# Patient Record
Sex: FEMALE | NOT HISPANIC OR LATINO
[De-identification: names, ages, dates, MRNs, and addresses within clinical notes are randomized per-mention and may not be internally consistent; named-entity substitution may affect disease eponyms.]

---

## 2017-08-11 ENCOUNTER — APPOINTMENT (OUTPATIENT)
Dept: HOME HEALTH SERVICES | Facility: HOME HEALTH | Age: 82
End: 2017-08-11
Payer: MEDICARE

## 2017-08-11 VITALS
HEART RATE: 76 BPM | TEMPERATURE: 98.3 F | HEIGHT: 66 IN | OXYGEN SATURATION: 97 % | RESPIRATION RATE: 12 BRPM | BODY MASS INDEX: 22.18 KG/M2 | WEIGHT: 138 LBS | SYSTOLIC BLOOD PRESSURE: 110 MMHG | DIASTOLIC BLOOD PRESSURE: 60 MMHG

## 2017-08-11 DIAGNOSIS — M79.2 NEURALGIA AND NEURITIS, UNSPECIFIED: ICD-10-CM

## 2017-08-11 DIAGNOSIS — Z78.9 OTHER SPECIFIED HEALTH STATUS: ICD-10-CM

## 2017-08-11 DIAGNOSIS — H91.90 UNSPECIFIED HEARING LOSS, UNSPECIFIED EAR: ICD-10-CM

## 2017-08-11 DIAGNOSIS — Z86.69 PERSONAL HISTORY OF OTHER DISEASES OF THE NERVOUS SYSTEM AND SENSE ORGANS: ICD-10-CM

## 2017-08-11 DIAGNOSIS — Z86.73 PERSONAL HISTORY OF TRANSIENT ISCHEMIC ATTACK (TIA), AND CEREBRAL INFARCTION W/OUT RESIDUAL DEFICITS: ICD-10-CM

## 2017-08-11 DIAGNOSIS — M81.0 AGE-RELATED OSTEOPOROSIS W/OUT CURRENT PATHOLOGICAL FRACTURE: ICD-10-CM

## 2017-08-11 DIAGNOSIS — E78.5 HYPERLIPIDEMIA, UNSPECIFIED: ICD-10-CM

## 2017-08-11 DIAGNOSIS — D32.9 BENIGN NEOPLASM OF MENINGES, UNSPECIFIED: ICD-10-CM

## 2017-08-11 DIAGNOSIS — F32.9 MAJOR DEPRESSIVE DISORDER, SINGLE EPISODE, UNSPECIFIED: ICD-10-CM

## 2017-08-11 DIAGNOSIS — Z87.891 PERSONAL HISTORY OF NICOTINE DEPENDENCE: ICD-10-CM

## 2017-08-11 DIAGNOSIS — R32 UNSPECIFIED URINARY INCONTINENCE: ICD-10-CM

## 2017-08-11 PROCEDURE — G0438: CPT

## 2017-08-11 PROCEDURE — G0506: CPT

## 2017-08-11 PROCEDURE — 99345 HOME/RES VST NEW HIGH MDM 75: CPT | Mod: 25

## 2017-08-11 RX ORDER — ALPRAZOLAM 0.25 MG/1
0.25 TABLET ORAL
Qty: 5 | Refills: 0 | Status: DISCONTINUED | COMMUNITY
Start: 2017-05-01

## 2017-08-16 PROBLEM — F32.9 DEPRESSION: Status: ACTIVE | Noted: 2017-08-11

## 2017-08-16 LAB
24R-OH-CALCIDIOL SERPL-MCNC: 32 PG/ML
ALBUMIN SERPL ELPH-MCNC: 3.3 G/DL
ALP BLD-CCNC: 69 U/L
ALT SERPL-CCNC: 8 U/L
ANION GAP SERPL CALC-SCNC: 15 MMOL/L
AST SERPL-CCNC: 13 U/L
BASOPHILS # BLD AUTO: 0.05 K/UL
BASOPHILS NFR BLD AUTO: 0.8 %
BILIRUB SERPL-MCNC: 0.3 MG/DL
BUN SERPL-MCNC: 37 MG/DL
CALCIUM SERPL-MCNC: 8.5 MG/DL
CHLORIDE SERPL-SCNC: 102 MMOL/L
CHOLEST SERPL-MCNC: 99 MG/DL
CHOLEST/HDLC SERPL: 2.5 RATIO
CO2 SERPL-SCNC: 26 MMOL/L
CREAT SERPL-MCNC: 1.06 MG/DL
EOSINOPHIL # BLD AUTO: 0.24 K/UL
EOSINOPHIL NFR BLD AUTO: 4 %
FOLATE SERPL-MCNC: >20 NG/ML
GLUCOSE SERPL-MCNC: 52 MG/DL
HBA1C MFR BLD HPLC: 5.1 %
HCT VFR BLD CALC: 32.8 %
HDLC SERPL-MCNC: 39 MG/DL
HGB BLD-MCNC: 10.1 G/DL
IMM GRANULOCYTES NFR BLD AUTO: 0.2 %
LDLC SERPL CALC-MCNC: 44 MG/DL
LYMPHOCYTES # BLD AUTO: 1.46 K/UL
LYMPHOCYTES NFR BLD AUTO: 24.1 %
MAN DIFF?: NORMAL
MCHC RBC-ENTMCNC: 27.7 PG
MCHC RBC-ENTMCNC: 30.8 GM/DL
MCV RBC AUTO: 90.1 FL
MONOCYTES # BLD AUTO: 0.54 K/UL
MONOCYTES NFR BLD AUTO: 8.9 %
NEUTROPHILS # BLD AUTO: 3.77 K/UL
NEUTROPHILS NFR BLD AUTO: 62 %
PLATELET # BLD AUTO: 212 K/UL
POTASSIUM SERPL-SCNC: 4.5 MMOL/L
PROT SERPL-MCNC: 5.7 G/DL
RBC # BLD: 3.64 M/UL
RBC # FLD: 15.6 %
SODIUM SERPL-SCNC: 143 MMOL/L
TRIGL SERPL-MCNC: 82 MG/DL
TSH SERPL-ACNC: 2.12 UIU/ML
VIT B12 SERPL-MCNC: 570 PG/ML
WBC # FLD AUTO: 6.07 K/UL

## 2017-08-18 ENCOUNTER — CLINICAL ADVICE (OUTPATIENT)
Age: 82
End: 2017-08-18

## 2017-08-28 ENCOUNTER — CLINICAL ADVICE (OUTPATIENT)
Age: 82
End: 2017-08-28

## 2017-08-29 ENCOUNTER — APPOINTMENT (OUTPATIENT)
Dept: HOME HEALTH SERVICES | Facility: HOME HEALTH | Age: 82
End: 2017-08-29

## 2017-08-29 VITALS
DIASTOLIC BLOOD PRESSURE: 60 MMHG | OXYGEN SATURATION: 96 % | TEMPERATURE: 98.2 F | HEART RATE: 84 BPM | SYSTOLIC BLOOD PRESSURE: 140 MMHG | RESPIRATION RATE: 16 BRPM

## 2017-09-01 ENCOUNTER — CLINICAL ADVICE (OUTPATIENT)
Age: 82
End: 2017-09-01

## 2017-09-05 ENCOUNTER — MEDICATION RENEWAL (OUTPATIENT)
Age: 82
End: 2017-09-05

## 2017-09-05 DIAGNOSIS — Z00.00 ENCOUNTER FOR GENERAL ADULT MEDICAL EXAMINATION W/OUT ABNORMAL FINDINGS: ICD-10-CM

## 2017-09-05 DIAGNOSIS — G47.00 INSOMNIA, UNSPECIFIED: ICD-10-CM

## 2017-09-05 DIAGNOSIS — K21.9 GASTRO-ESOPHAGEAL REFLUX DISEASE W/OUT ESOPHAGITIS: ICD-10-CM

## 2017-09-15 ENCOUNTER — APPOINTMENT (OUTPATIENT)
Dept: HOME HEALTH SERVICES | Facility: HOME HEALTH | Age: 82
End: 2017-09-15
Payer: MEDICARE

## 2017-09-15 VITALS
OXYGEN SATURATION: 98 % | SYSTOLIC BLOOD PRESSURE: 150 MMHG | RESPIRATION RATE: 12 BRPM | TEMPERATURE: 99 F | HEART RATE: 95 BPM | DIASTOLIC BLOOD PRESSURE: 62 MMHG

## 2017-09-15 DIAGNOSIS — G89.29 OTHER CHRONIC PAIN: ICD-10-CM

## 2017-09-15 PROCEDURE — 99349 HOME/RES VST EST MOD MDM 40: CPT

## 2017-09-15 RX ORDER — OXYCODONE 5 MG/1
5 TABLET ORAL
Qty: 60 | Refills: 0 | Status: DISCONTINUED | COMMUNITY
Start: 2017-08-11 | End: 2017-09-15

## 2017-09-15 RX ORDER — FENTANYL 12 UG/H
12 PATCH, EXTENDED RELEASE TRANSDERMAL
Qty: 2 | Refills: 0 | Status: DISCONTINUED | COMMUNITY
Start: 2017-08-08 | End: 2017-09-15

## 2017-09-25 ENCOUNTER — CLINICAL ADVICE (OUTPATIENT)
Age: 82
End: 2017-09-25

## 2017-09-25 ENCOUNTER — APPOINTMENT (OUTPATIENT)
Dept: HOME HEALTH SERVICES | Facility: HOME HEALTH | Age: 82
End: 2017-09-25

## 2017-09-25 VITALS
HEART RATE: 76 BPM | OXYGEN SATURATION: 94 % | RESPIRATION RATE: 16 BRPM | SYSTOLIC BLOOD PRESSURE: 110 MMHG | TEMPERATURE: 99.6 F | DIASTOLIC BLOOD PRESSURE: 60 MMHG

## 2017-09-25 DIAGNOSIS — R50.9 FEVER, UNSPECIFIED: ICD-10-CM

## 2017-09-25 RX ORDER — LEVOFLOXACIN 500 MG/1
500 TABLET, FILM COATED ORAL DAILY
Qty: 7 | Refills: 0 | Status: COMPLETED | COMMUNITY
Start: 2017-09-25 | End: 2017-10-02

## 2017-09-26 ENCOUNTER — CLINICAL ADVICE (OUTPATIENT)
Age: 82
End: 2017-09-26

## 2017-09-26 ENCOUNTER — INPATIENT (INPATIENT)
Facility: HOSPITAL | Age: 82
LOS: 5 days | Discharge: NURSING, REHAB OR LONG-TERM CARE | DRG: 184 | End: 2017-10-02
Attending: SURGERY | Admitting: SURGERY
Payer: MEDICARE

## 2017-09-26 VITALS
RESPIRATION RATE: 16 BRPM | SYSTOLIC BLOOD PRESSURE: 123 MMHG | DIASTOLIC BLOOD PRESSURE: 65 MMHG | WEIGHT: 160.06 LBS | HEART RATE: 65 BPM | TEMPERATURE: 98 F | OXYGEN SATURATION: 92 %

## 2017-09-26 DIAGNOSIS — S22.39XA FRACTURE OF ONE RIB, UNSPECIFIED SIDE, INITIAL ENCOUNTER FOR CLOSED FRACTURE: ICD-10-CM

## 2017-09-26 LAB
ALBUMIN SERPL ELPH-MCNC: 3.7 G/DL — SIGNIFICANT CHANGE UP (ref 3.3–5.2)
ALP SERPL-CCNC: 81 U/L — SIGNIFICANT CHANGE UP (ref 40–120)
ALT FLD-CCNC: 14 U/L — SIGNIFICANT CHANGE UP
ANION GAP SERPL CALC-SCNC: 14 MMOL/L — SIGNIFICANT CHANGE UP (ref 5–17)
APPEARANCE UR: CLEAR — SIGNIFICANT CHANGE UP
APTT BLD: 28.7 SEC — SIGNIFICANT CHANGE UP (ref 27.5–37.4)
AST SERPL-CCNC: 25 U/L — SIGNIFICANT CHANGE UP
BACTERIA # UR AUTO: ABNORMAL
BASE EXCESS BLDA CALC-SCNC: 1.5 MMOL/L — SIGNIFICANT CHANGE UP (ref -3–3)
BILIRUB SERPL-MCNC: 0.3 MG/DL — LOW (ref 0.4–2)
BILIRUB UR-MCNC: NEGATIVE — SIGNIFICANT CHANGE UP
BLOOD GAS COMMENTS ARTERIAL: SIGNIFICANT CHANGE UP
BUN SERPL-MCNC: 21 MG/DL — HIGH (ref 8–20)
CALCIUM SERPL-MCNC: 8.7 MG/DL — SIGNIFICANT CHANGE UP (ref 8.6–10.2)
CHLORIDE SERPL-SCNC: 97 MMOL/L — LOW (ref 98–107)
CO2 SERPL-SCNC: 25 MMOL/L — SIGNIFICANT CHANGE UP (ref 22–29)
COLOR SPEC: SIGNIFICANT CHANGE UP
CREAT SERPL-MCNC: 0.83 MG/DL — SIGNIFICANT CHANGE UP (ref 0.5–1.3)
DIFF PNL FLD: ABNORMAL
EPI CELLS # UR: SIGNIFICANT CHANGE UP
GAS PNL BLDA: SIGNIFICANT CHANGE UP
GLUCOSE SERPL-MCNC: 82 MG/DL — SIGNIFICANT CHANGE UP (ref 70–115)
GLUCOSE UR QL: NEGATIVE MG/DL — SIGNIFICANT CHANGE UP
HCO3 BLDA-SCNC: 26 MMOL/L — SIGNIFICANT CHANGE UP (ref 20–26)
HCT VFR BLD CALC: 43.3 % — SIGNIFICANT CHANGE UP (ref 37–47)
HGB BLD-MCNC: 13.7 G/DL — SIGNIFICANT CHANGE UP (ref 12–16)
HOROWITZ INDEX BLDA+IHG-RTO: SIGNIFICANT CHANGE UP
INR BLD: 1.12 RATIO — SIGNIFICANT CHANGE UP (ref 0.88–1.16)
KETONES UR-MCNC: ABNORMAL
LEUKOCYTE ESTERASE UR-ACNC: ABNORMAL
MCHC RBC-ENTMCNC: 26.9 PG — LOW (ref 27–31)
MCHC RBC-ENTMCNC: 31.6 G/DL — LOW (ref 32–36)
MCV RBC AUTO: 85.1 FL — SIGNIFICANT CHANGE UP (ref 81–99)
NITRITE UR-MCNC: NEGATIVE — SIGNIFICANT CHANGE UP
PCO2 BLDA: 42 MMHG — SIGNIFICANT CHANGE UP (ref 35–45)
PH BLDA: 7.41 — SIGNIFICANT CHANGE UP (ref 7.35–7.45)
PH UR: 6 — SIGNIFICANT CHANGE UP (ref 5–8)
PLATELET # BLD AUTO: 209 K/UL — SIGNIFICANT CHANGE UP (ref 150–400)
PO2 BLDA: 57 MMHG — LOW (ref 83–108)
POTASSIUM SERPL-MCNC: 4.1 MMOL/L — SIGNIFICANT CHANGE UP (ref 3.5–5.3)
POTASSIUM SERPL-SCNC: 4.1 MMOL/L — SIGNIFICANT CHANGE UP (ref 3.5–5.3)
PROT SERPL-MCNC: 6.6 G/DL — SIGNIFICANT CHANGE UP (ref 6.6–8.7)
PROT UR-MCNC: 30 MG/DL
PROTHROM AB SERPL-ACNC: 12.3 SEC — SIGNIFICANT CHANGE UP (ref 9.8–12.7)
RBC # BLD: 5.09 M/UL — SIGNIFICANT CHANGE UP (ref 4.4–5.2)
RBC # FLD: 14.5 % — SIGNIFICANT CHANGE UP (ref 11–15.6)
RBC CASTS # UR COMP ASSIST: ABNORMAL /HPF (ref 0–4)
SAO2 % BLDA: 90 % — LOW (ref 95–99)
SODIUM SERPL-SCNC: 136 MMOL/L — SIGNIFICANT CHANGE UP (ref 135–145)
SP GR SPEC: 1.01 — SIGNIFICANT CHANGE UP (ref 1.01–1.02)
UROBILINOGEN FLD QL: NEGATIVE MG/DL — SIGNIFICANT CHANGE UP
WBC # BLD: 9.5 K/UL — SIGNIFICANT CHANGE UP (ref 4.8–10.8)
WBC # FLD AUTO: 9.5 K/UL — SIGNIFICANT CHANGE UP (ref 4.8–10.8)
WBC UR QL: SIGNIFICANT CHANGE UP

## 2017-09-26 PROCEDURE — 71010: CPT | Mod: 26

## 2017-09-26 PROCEDURE — 71250 CT THORAX DX C-: CPT | Mod: 26

## 2017-09-26 PROCEDURE — 73502 X-RAY EXAM HIP UNI 2-3 VIEWS: CPT | Mod: 26,RT

## 2017-09-26 PROCEDURE — 99285 EMERGENCY DEPT VISIT HI MDM: CPT

## 2017-09-26 PROCEDURE — 72125 CT NECK SPINE W/O DYE: CPT | Mod: 26

## 2017-09-26 PROCEDURE — 70450 CT HEAD/BRAIN W/O DYE: CPT | Mod: 26

## 2017-09-26 RX ORDER — CLOPIDOGREL BISULFATE 75 MG/1
75 TABLET, FILM COATED ORAL DAILY
Qty: 0 | Refills: 0 | Status: DISCONTINUED | OUTPATIENT
Start: 2017-09-26 | End: 2017-10-02

## 2017-09-26 RX ORDER — MIRTAZAPINE 45 MG/1
15 TABLET, ORALLY DISINTEGRATING ORAL AT BEDTIME
Qty: 0 | Refills: 0 | Status: DISCONTINUED | OUTPATIENT
Start: 2017-09-26 | End: 2017-10-02

## 2017-09-26 RX ORDER — ALPRAZOLAM 0.25 MG
0.25 TABLET ORAL ONCE
Qty: 0 | Refills: 0 | Status: DISCONTINUED | OUTPATIENT
Start: 2017-09-26 | End: 2017-09-26

## 2017-09-26 RX ORDER — ATORVASTATIN CALCIUM 80 MG/1
80 TABLET, FILM COATED ORAL AT BEDTIME
Qty: 0 | Refills: 0 | Status: DISCONTINUED | OUTPATIENT
Start: 2017-09-26 | End: 2017-10-02

## 2017-09-26 RX ORDER — SENNA PLUS 8.6 MG/1
2 TABLET ORAL AT BEDTIME
Qty: 0 | Refills: 0 | Status: DISCONTINUED | OUTPATIENT
Start: 2017-09-26 | End: 2017-10-02

## 2017-09-26 RX ORDER — ACETAMINOPHEN 500 MG
650 TABLET ORAL EVERY 6 HOURS
Qty: 0 | Refills: 0 | Status: DISCONTINUED | OUTPATIENT
Start: 2017-09-26 | End: 2017-10-02

## 2017-09-26 RX ORDER — IBUPROFEN 200 MG
400 TABLET ORAL EVERY 4 HOURS
Qty: 0 | Refills: 0 | Status: DISCONTINUED | OUTPATIENT
Start: 2017-09-26 | End: 2017-10-02

## 2017-09-26 RX ORDER — CIPROFLOXACIN LACTATE 400MG/40ML
500 VIAL (ML) INTRAVENOUS EVERY 12 HOURS
Qty: 0 | Refills: 0 | Status: DISCONTINUED | OUTPATIENT
Start: 2017-09-26 | End: 2017-09-26

## 2017-09-26 RX ORDER — POLYETHYLENE GLYCOL 3350 17 G/17G
17 POWDER, FOR SOLUTION ORAL
Qty: 0 | Refills: 0 | Status: DISCONTINUED | OUTPATIENT
Start: 2017-09-26 | End: 2017-10-02

## 2017-09-26 RX ORDER — AMLODIPINE BESYLATE 2.5 MG/1
5 TABLET ORAL DAILY
Qty: 0 | Refills: 0 | Status: DISCONTINUED | OUTPATIENT
Start: 2017-09-26 | End: 2017-10-02

## 2017-09-26 RX ORDER — ENOXAPARIN SODIUM 100 MG/ML
40 INJECTION SUBCUTANEOUS DAILY
Qty: 0 | Refills: 0 | Status: DISCONTINUED | OUTPATIENT
Start: 2017-09-26 | End: 2017-10-02

## 2017-09-26 RX ORDER — GABAPENTIN 400 MG/1
100 CAPSULE ORAL EVERY 8 HOURS
Qty: 0 | Refills: 0 | Status: DISCONTINUED | OUTPATIENT
Start: 2017-09-26 | End: 2017-10-02

## 2017-09-26 RX ORDER — LIDOCAINE 4 G/100G
1 CREAM TOPICAL DAILY
Qty: 0 | Refills: 0 | Status: DISCONTINUED | OUTPATIENT
Start: 2017-09-26 | End: 2017-10-02

## 2017-09-26 RX ORDER — HYDROCHLOROTHIAZIDE 25 MG
25 TABLET ORAL DAILY
Qty: 0 | Refills: 0 | Status: DISCONTINUED | OUTPATIENT
Start: 2017-09-26 | End: 2017-09-28

## 2017-09-26 RX ORDER — SODIUM CHLORIDE 9 MG/ML
1000 INJECTION INTRAMUSCULAR; INTRAVENOUS; SUBCUTANEOUS ONCE
Qty: 0 | Refills: 0 | Status: COMPLETED | OUTPATIENT
Start: 2017-09-26 | End: 2017-09-26

## 2017-09-26 RX ORDER — LISINOPRIL 2.5 MG/1
10 TABLET ORAL DAILY
Qty: 0 | Refills: 0 | Status: DISCONTINUED | OUTPATIENT
Start: 2017-09-26 | End: 2017-10-02

## 2017-09-26 RX ORDER — IPRATROPIUM/ALBUTEROL SULFATE 18-103MCG
3 AEROSOL WITH ADAPTER (GRAM) INHALATION EVERY 6 HOURS
Qty: 0 | Refills: 0 | Status: DISCONTINUED | OUTPATIENT
Start: 2017-09-26 | End: 2017-10-02

## 2017-09-26 RX ORDER — SERTRALINE 25 MG/1
25 TABLET, FILM COATED ORAL DAILY
Qty: 0 | Refills: 0 | Status: DISCONTINUED | OUTPATIENT
Start: 2017-09-26 | End: 2017-10-02

## 2017-09-26 RX ORDER — DOCUSATE SODIUM 100 MG
100 CAPSULE ORAL THREE TIMES A DAY
Qty: 0 | Refills: 0 | Status: DISCONTINUED | OUTPATIENT
Start: 2017-09-26 | End: 2017-10-02

## 2017-09-26 RX ORDER — ONDANSETRON 8 MG/1
4 TABLET, FILM COATED ORAL ONCE
Qty: 0 | Refills: 0 | Status: COMPLETED | OUTPATIENT
Start: 2017-09-26 | End: 2017-09-26

## 2017-09-26 RX ORDER — CIPROFLOXACIN LACTATE 400MG/40ML
500 VIAL (ML) INTRAVENOUS EVERY 12 HOURS
Qty: 0 | Refills: 0 | Status: DISCONTINUED | OUTPATIENT
Start: 2017-09-26 | End: 2017-09-28

## 2017-09-26 RX ADMIN — POLYETHYLENE GLYCOL 3350 17 GRAM(S): 17 POWDER, FOR SOLUTION ORAL at 22:51

## 2017-09-26 RX ADMIN — ATORVASTATIN CALCIUM 80 MILLIGRAM(S): 80 TABLET, FILM COATED ORAL at 22:53

## 2017-09-26 RX ADMIN — Medication 500 MILLIGRAM(S): at 22:50

## 2017-09-26 RX ADMIN — Medication 650 MILLIGRAM(S): at 22:51

## 2017-09-26 RX ADMIN — ONDANSETRON 4 MILLIGRAM(S): 8 TABLET, FILM COATED ORAL at 18:12

## 2017-09-26 RX ADMIN — SERTRALINE 25 MILLIGRAM(S): 25 TABLET, FILM COATED ORAL at 22:52

## 2017-09-26 RX ADMIN — AMLODIPINE BESYLATE 5 MILLIGRAM(S): 2.5 TABLET ORAL at 22:52

## 2017-09-26 RX ADMIN — Medication 25 MILLIGRAM(S): at 22:52

## 2017-09-26 RX ADMIN — LIDOCAINE 1 PATCH: 4 CREAM TOPICAL at 22:50

## 2017-09-26 RX ADMIN — Medication 0.25 MILLIGRAM(S): at 18:10

## 2017-09-26 RX ADMIN — CLOPIDOGREL BISULFATE 75 MILLIGRAM(S): 75 TABLET, FILM COATED ORAL at 22:53

## 2017-09-26 RX ADMIN — Medication 650 MILLIGRAM(S): at 23:52

## 2017-09-26 RX ADMIN — GABAPENTIN 100 MILLIGRAM(S): 400 CAPSULE ORAL at 22:52

## 2017-09-26 RX ADMIN — MIRTAZAPINE 15 MILLIGRAM(S): 45 TABLET, ORALLY DISINTEGRATING ORAL at 22:53

## 2017-09-26 RX ADMIN — LISINOPRIL 10 MILLIGRAM(S): 2.5 TABLET ORAL at 22:52

## 2017-09-26 RX ADMIN — ENOXAPARIN SODIUM 40 MILLIGRAM(S): 100 INJECTION SUBCUTANEOUS at 22:51

## 2017-09-26 RX ADMIN — SODIUM CHLORIDE 1000 MILLILITER(S): 9 INJECTION INTRAMUSCULAR; INTRAVENOUS; SUBCUTANEOUS at 14:29

## 2017-09-26 NOTE — ED PROVIDER NOTE - MEDICAL DECISION MAKING DETAILS
89 y/o F presents after fall. CT scan of head and neck and chest ordered. X-ray of chest and pelvis ordered. Will reevaluate.

## 2017-09-26 NOTE — H&P ADULT - HISTORY OF PRESENT ILLNESS
89 y/o F who presents to the ED after falling after feeling dizzy. Pt states she has never had this dizzy feeling before but did not black out or LOC. She states she hit her head. Denies any neck tenderness, changes of vision, CP, SOB. Complains of pain to the right chest and coccyx. She has a history of multiple falls where she is lives at [facility]. GCS 15. Pupils reactive b/l 4mm. Pulls 500cc on IS.   A: airway intact  B: clear equal b/l lungs  C: RRR, central pulses intact    CT head shows R ight posterior fossa mass, which is a mengioma which she is aware of  CT Neck No fracture  CT chest: R 3rd-6th rib fracture, R posteriorlateral 9-10 rib fracture, R pleural effusion, R nodule in the lingula

## 2017-09-26 NOTE — H&P ADULT - NSHPPHYSICALEXAM_GEN_ALL_CORE
Gen: A&Ox3, NAD  HEENT: normocephalic, Atraumatic, EOMI, Pupils reactive b/l 4mm, No cervical neck tenderness  Cardio:RRR  Pulm: Diminished at bases  Abd; Soft, ND, NT, no rebound or guarding  Extremities: moves all extremities, Nontender, distal pulses intact, sensation intact  Back: nontender  Pelvis: stable, nontender

## 2017-09-26 NOTE — ED ADULT NURSE NOTE - OBJECTIVE STATEMENT
LAte entry : Pt presented to ED s/p unwitnessed fall, pt states she became very dizzy and lightheaded and fell down striking her head on the floor, denies LOC , c/o cocyx pain , back of the head pain, R hip . As per her granddaughter which is at the bedside , pt has two head tumors

## 2017-09-26 NOTE — ED PROVIDER NOTE - OBJECTIVE STATEMENT
87 y/o F, with PMH of stroke, retinal detachment, HTN, hearing problems, meningiomas,  and hx of falls 2x before, was well until today when she felt dizzy and fell in her room at the nursing home. Pt states she had a cold since Sunday and was prescribed antibiotics. Pt states she ate breakfast and got up and fell. Pt states she did not lose consciousness. Pt states she started moving toward her button to call the nurse. Pt states her R. hip, back of her head, and lower back hurts. Pt's granddaughter states pt fell before and broke both hips and her tailbone.

## 2017-09-26 NOTE — CHART NOTE - NSCHARTNOTEFT_GEN_A_CORE
SW Note - [facility] but has been falling and may not be able to return. Family is open to LTC but would like [facility] to LTC – gave family both short and long term lists – they will choose one that has LTC – probably [facility].  Need PT rosalba and JORGE

## 2017-09-26 NOTE — ED PROVIDER NOTE - MUSCULOSKELETAL, MLM
Spine appears normal, range of motion is not limited, no muscle or joint tenderness except for tenderness of right hip

## 2017-09-26 NOTE — ED ADULT TRIAGE NOTE - CHIEF COMPLAINT QUOTE
pt comes to ed from [facility] after getting dizzy and falling. patient reports she is unsure if she hit head. pt on plavix. awake alert and oriented x3.

## 2017-09-26 NOTE — ED ADULT NURSE NOTE - CHIEF COMPLAINT QUOTE
pt comes to ed from [facility] after getting dizzy and falling. patient reports she is unsure if she hit head. pt on plavix. awake alert and oriented x3.

## 2017-09-26 NOTE — ED PROVIDER NOTE - PROGRESS NOTE DETAILS
trauma service called for trauma consult pt signed out to Dr Jacome for final dispo As per sign-out from Dr. Cummins patient is awaiting trauma consultation. Trauma request ABG and will decide if will admit to their service.

## 2017-09-26 NOTE — ED PROVIDER NOTE - CARE PLAN
Principal Discharge DX:	Rib fractures  Secondary Diagnosis:	Lung mass  Secondary Diagnosis:	Brain mass

## 2017-09-26 NOTE — H&P ADULT - ASSESSMENT
89 y/o F s/p fall from standing after feeling dizzy with right sided rib fractures  ribs 3-6, and posterior lateral 9-10.   - ABG to follow up with results

## 2017-09-26 NOTE — ED ADULT NURSE REASSESSMENT NOTE - NS ED NURSE REASSESS COMMENT FT1
Report received from off going RN, charting as noted. Patient A&Ox4, denies any pain or discomfort. Respirations even & unlabored, denies any numbness or tingling. Denies any chest pain, shortness of breath, nausea or dizziness. Stated "only feels achy". IV site C/D/I, patent, negative s/s phlebitis or infiltration. Family at bedside. Plan of care discussed, all questions answered. Will continue to monitor.

## 2017-09-26 NOTE — H&P ADULT - ATTENDING COMMENTS
The patient was seen and examined  Details per the resident's H&P  This is an 88-year old woman who presents to the ED after a ground level fall  The patient complains of coccyx pain  The patient states that she was ambulating with her walker and became acutely dizzy causing her to fall  She reports a recent URI being treated with antibiotics  She also reports a history of falls in the past (most recently --pelvic fracture)    Exam:  Awake and alert  GCS=15, pupils are equal and reactive  Head is atraumatic  Neck is non-tender  Chest B/L air entry, tender right side  Abdomen is soft, non-tender  Pelvis is stable  Extremities are atraumatic    CXR: questionable right hemothorax  CT images reviewed    AB    Impression:  S/P ground level fall  Multiple right rib fractures  Right hemothorax  Right lung lesion  Syncope    Plan:  Admit to cardiac monitored bed  Echocardiogram  Carotid duplex  Pain  management--lidoderm patch, NSAID's  Pulmonary hygiene--incentive spirometer, inhaled beta agonists  Mobilize with PT  Thoracic surgery consult for new lung lesion

## 2017-09-27 PROCEDURE — 93880 EXTRACRANIAL BILAT STUDY: CPT | Mod: 26

## 2017-09-27 RX ADMIN — Medication 650 MILLIGRAM(S): at 12:42

## 2017-09-27 RX ADMIN — ATORVASTATIN CALCIUM 80 MILLIGRAM(S): 80 TABLET, FILM COATED ORAL at 21:06

## 2017-09-27 RX ADMIN — Medication 500 MILLIGRAM(S): at 12:39

## 2017-09-27 RX ADMIN — Medication 3 MILLILITER(S): at 03:19

## 2017-09-27 RX ADMIN — ENOXAPARIN SODIUM 40 MILLIGRAM(S): 100 INJECTION SUBCUTANEOUS at 12:40

## 2017-09-27 RX ADMIN — POLYETHYLENE GLYCOL 3350 17 GRAM(S): 17 POWDER, FOR SOLUTION ORAL at 18:12

## 2017-09-27 RX ADMIN — LIDOCAINE 1 PATCH: 4 CREAM TOPICAL at 13:17

## 2017-09-27 RX ADMIN — Medication 100 MILLIGRAM(S): at 14:50

## 2017-09-27 RX ADMIN — Medication 650 MILLIGRAM(S): at 14:48

## 2017-09-27 RX ADMIN — MIRTAZAPINE 15 MILLIGRAM(S): 45 TABLET, ORALLY DISINTEGRATING ORAL at 21:06

## 2017-09-27 RX ADMIN — Medication 400 MILLIGRAM(S): at 21:07

## 2017-09-27 RX ADMIN — GABAPENTIN 100 MILLIGRAM(S): 400 CAPSULE ORAL at 05:24

## 2017-09-27 RX ADMIN — Medication 650 MILLIGRAM(S): at 08:23

## 2017-09-27 RX ADMIN — Medication 500 MILLIGRAM(S): at 21:06

## 2017-09-27 RX ADMIN — Medication 400 MILLIGRAM(S): at 21:37

## 2017-09-27 RX ADMIN — GABAPENTIN 100 MILLIGRAM(S): 400 CAPSULE ORAL at 21:06

## 2017-09-27 RX ADMIN — GABAPENTIN 100 MILLIGRAM(S): 400 CAPSULE ORAL at 14:50

## 2017-09-27 RX ADMIN — LIDOCAINE 1 PATCH: 4 CREAM TOPICAL at 12:45

## 2017-09-27 RX ADMIN — SERTRALINE 25 MILLIGRAM(S): 25 TABLET, FILM COATED ORAL at 12:37

## 2017-09-27 RX ADMIN — Medication 650 MILLIGRAM(S): at 18:13

## 2017-09-27 RX ADMIN — CLOPIDOGREL BISULFATE 75 MILLIGRAM(S): 75 TABLET, FILM COATED ORAL at 12:38

## 2017-09-27 RX ADMIN — POLYETHYLENE GLYCOL 3350 17 GRAM(S): 17 POWDER, FOR SOLUTION ORAL at 05:24

## 2017-09-27 RX ADMIN — Medication 3 MILLILITER(S): at 15:23

## 2017-09-27 RX ADMIN — Medication 650 MILLIGRAM(S): at 05:26

## 2017-09-27 RX ADMIN — Medication 650 MILLIGRAM(S): at 19:42

## 2017-09-27 RX ADMIN — Medication 3 MILLILITER(S): at 20:43

## 2017-09-27 NOTE — SWALLOW BEDSIDE ASSESSMENT ADULT - ASR SWALLOW ASPIRATION MONITOR
oral hygiene/pneumonia/throat clearing/change of breathing pattern/position upright (89Y)/gurgly voice/cough/fever

## 2017-09-27 NOTE — SWALLOW BEDSIDE ASSESSMENT ADULT - SLP GENERAL OBSERVATIONS
Pt received sleeping on stretcher in ED, arousable to voice, ox3, +02 via nc, Sp02 95% baseline and throughout

## 2017-09-27 NOTE — SWALLOW BEDSIDE ASSESSMENT ADULT - SLP PERTINENT HISTORY OF CURRENT PROBLEM
Pt reports h/o difficulty swallowing pills. She noted  that her pills are typically crushed in applesauce. Pt denied dysphagia with solids/liquids

## 2017-09-28 DIAGNOSIS — I35.0 NONRHEUMATIC AORTIC (VALVE) STENOSIS: ICD-10-CM

## 2017-09-28 PROCEDURE — 99222 1ST HOSP IP/OBS MODERATE 55: CPT

## 2017-09-28 RX ORDER — NITROFURANTOIN MACROCRYSTAL 50 MG
100 CAPSULE ORAL
Qty: 0 | Refills: 0 | Status: DISCONTINUED | OUTPATIENT
Start: 2017-09-28 | End: 2017-09-29

## 2017-09-28 RX ADMIN — LIDOCAINE 1 PATCH: 4 CREAM TOPICAL at 13:32

## 2017-09-28 RX ADMIN — Medication 3 MILLILITER(S): at 08:19

## 2017-09-28 RX ADMIN — Medication 650 MILLIGRAM(S): at 11:12

## 2017-09-28 RX ADMIN — LIDOCAINE 1 PATCH: 4 CREAM TOPICAL at 01:02

## 2017-09-28 RX ADMIN — LISINOPRIL 10 MILLIGRAM(S): 2.5 TABLET ORAL at 05:37

## 2017-09-28 RX ADMIN — Medication 650 MILLIGRAM(S): at 12:00

## 2017-09-28 RX ADMIN — Medication 3 MILLILITER(S): at 21:09

## 2017-09-28 RX ADMIN — Medication 500 MILLIGRAM(S): at 11:10

## 2017-09-28 RX ADMIN — SERTRALINE 25 MILLIGRAM(S): 25 TABLET, FILM COATED ORAL at 11:12

## 2017-09-28 RX ADMIN — GABAPENTIN 100 MILLIGRAM(S): 400 CAPSULE ORAL at 05:38

## 2017-09-28 RX ADMIN — Medication 650 MILLIGRAM(S): at 18:06

## 2017-09-28 RX ADMIN — GABAPENTIN 100 MILLIGRAM(S): 400 CAPSULE ORAL at 19:52

## 2017-09-28 RX ADMIN — Medication 650 MILLIGRAM(S): at 05:37

## 2017-09-28 RX ADMIN — CLOPIDOGREL BISULFATE 75 MILLIGRAM(S): 75 TABLET, FILM COATED ORAL at 13:36

## 2017-09-28 RX ADMIN — Medication 25 MILLIGRAM(S): at 05:37

## 2017-09-28 RX ADMIN — GABAPENTIN 100 MILLIGRAM(S): 400 CAPSULE ORAL at 14:31

## 2017-09-28 RX ADMIN — Medication 650 MILLIGRAM(S): at 18:50

## 2017-09-28 RX ADMIN — Medication 650 MILLIGRAM(S): at 05:43

## 2017-09-28 RX ADMIN — ATORVASTATIN CALCIUM 80 MILLIGRAM(S): 80 TABLET, FILM COATED ORAL at 19:52

## 2017-09-28 RX ADMIN — ENOXAPARIN SODIUM 40 MILLIGRAM(S): 100 INJECTION SUBCUTANEOUS at 11:11

## 2017-09-28 RX ADMIN — Medication 3 MILLILITER(S): at 15:25

## 2017-09-28 RX ADMIN — MIRTAZAPINE 15 MILLIGRAM(S): 45 TABLET, ORALLY DISINTEGRATING ORAL at 19:52

## 2017-09-28 RX ADMIN — Medication 100 MILLIGRAM(S): at 18:05

## 2017-09-28 RX ADMIN — AMLODIPINE BESYLATE 5 MILLIGRAM(S): 2.5 TABLET ORAL at 05:37

## 2017-09-28 NOTE — CONSULT NOTE ADULT - ASSESSMENT
Patient is an elderly 88 year old lady with past medical history of hypertension, meningioma, history of CVA, history of falls in the past, lives [facility](Assisted living facility or Nursing home) walks with the help of a walker admitted to the hospital because of dizziness and fall. Patients physical exam, labs reviewed.    1. Severe Aortic Stenosis- Mean gradient 42 mm Hg. LVEF 70-75%  2. HTN  3. Abnormal Chest CT scan suspicious for neoplasm.  4. Dizziness- ? Is it secondary to AS, unlikely.  Recommendations:  I had a long discussion with the patient about Severe Aortic Stenosis. I am sure at this point whether Aortic Stenosis is truly symptomatic, since she has limited capacity to exert. She walks with the help of a walker. Patient is a frail lady with multiple Comorbidities, is a high risk for Surgical AVR or TAVR.    Patient wants to discuss with her daughter.   Suggest D/C HCTZ  I will see the patient in am.  I have discussed about the patient with Surgical Resident. Patient is an elderly 88 year old lady with past medical history of hypertension, meningioma, history of CVA, history of falls in the past, lives [facility](Assisted living facility or Nursing home) walks with the help of a walker admitted to the hospital because of dizziness and fall. Patients physical exam, labs reviewed.    1. Severe Aortic Stenosis- Mean gradient 42 mm Hg. LVEF 70-75%  2. HTN  3. Abnormal Chest CT scan suspicious for neoplasm.  4. Dizziness- ? Is it secondary to AS, unlikely.  Recommendations:  I had a long discussion with the patient about Severe Aortic Stenosis. I am not sure at this point whether Aortic Stenosis is truly symptomatic, since she has limited capacity to exert. She walks with the help of a walker. Patient is a frail lady with multiple Comorbidities, is a high risk for Surgical AVR or TAVR.    Patient wants to discuss with her daughter.   Suggest D/C HCTZ  I will see the patient in am.  I have discussed about the patient with Surgical Resident.

## 2017-09-28 NOTE — CONSULT NOTE ADULT - SUBJECTIVE AND OBJECTIVE BOX
[facility]                                                        Office: [address] [address]                                                       Telephone: [phone]. Fax:[phone]                                                              CARDIOLOGY CONSULTATION NOTE                                                                                             Consult requested by:  Dr Pascual    Reason for Consultation: Aortic Stenosis    History obtained by: Patient and medical record  Patient interviewed.   obtained: No    Chief complaint:    Patient is a 88y old  Female who presents with a chief complaint of s/p fall from standing (26 Sep 2017 19:45)      HPI:  89 y/o F who presents to the ED after falling after feeling dizzy. Pt states she has never had this dizzy feeling before but did not black out or LOC. She states she hit her head. Denies any neck tenderness, changes of vision, CP, SOB. Complains of pain to the right chest and coccyx. She has a history of multiple falls where she is lives at [facility]. GCS 15. Pupils reactive b/l 4mm. Pulls 500cc on IS.   A: airway intact  B: clear equal b/l lungs  C: RRR, central pulses intact    CT head shows R ight posterior fossa mass, which is a mengioma which she is aware of  CT Neck No fracture  CT chest: R 3rd-6th rib fracture, R posteriorlateral 9-10 rib fracture, R pleural effusion, R nodule in the lingula (26 Sep 2017 19:45)    Patient is an elderly 88 year old lady with past medical history of hypertension, meningioma, history of CVA, history of falls in the past, lives [facility](Assisted living facility or Nursing home) walks with the help of a walker admitted to the hospital because of dizziness and fall. Patient denies any chest pain or dyspnea when she walks with a walker. Patient has no prior history of CAD or CHF. Patient has a remote history of smoking, quit smoking 30-40 years ago. Patient moved from [place] in the last 1 year. Patient reports that she was admitted to [facility] around 2017 with a history of fall. Patient denies any history of syncope but she has complaints of dizziness on and off. Patient denies orthopnea or PND. She has occasional episodes of leg edema. Patient has some hearing difficulty.    REVIEW OF SYMPTOMS: Cardiovascular:  See HPI. No chest pain,  No dyspnea,  No syncope,  No palpitations, No dizziness, No Orthopnea,      No Paroxsymal nocturnal dyspnea;  Respiratory:  No Dyspnea, No cough,     Genitourinary:  No dysuria, no hematuria; Gastrointestinal:  No nausea, no vomiting. No diarrhea.  No abdominal pain. No dark color stool, no melena ; Neurological: No headache, + dizziness, no slurred speech;  Psychiatric: No agitation, no anxiety.  ALL OTHER REVIEW OF SYSTEMS ARE NEGATIVE.    ALLERGIES: Allergies    No Known Allergies    Intolerances      CURRENT MEDICATIONS:  amLODIPine   Tablet 5 milliGRAM(s) Oral daily  hydrochlorothiazide 25 milliGRAM(s) Oral daily  lisinopril 10 milliGRAM(s) Oral daily    ALBUTerol/ipratropium for Nebulization   acetaminophen   Tablet.  sertraline  gabapentin  mirtazapine  polyethylene glycol 3350  docusate sodium  senna  lidocaine   Patch  clopidogrel Tablet  atorvastatin  enoxaparin Injectable  nitrofurantoin (MACROBID)      HOME MEDICATIONS:    PAST MEDICAL HISTORY  Fall  Stroke  Retinal detachment  Hypertension      PAST SURGICAL HISTORY  Right Femur surgery- has freda in the right leg    FAMILY HISTORY:  Noncontributory  Only child, no siblings.    SOCIAL HISTORY:    , she lost her  few months ago.  She lives in an assisted living facility, walks with the help of a walker.  CIGARETTES:   quit smoking 30-40 years ago.    ALCOHOL: No    DRUGS: No    Vital Signs Last 24 Hrs  T(C): 36.4 (28 Sep 2017 04:40), Max: 36.8 (27 Sep 2017 20:02)  T(F): 97.6 (28 Sep 2017 04:40), Max: 98.3 (27 Sep 2017 20:02)  HR: 76 (28 Sep 2017 08:20) (69 - 77)  BP: 143/72 (28 Sep 2017 04:40) (135/49 - 143/72)  BP(mean): --  RR: 18 (28 Sep 2017 04:40) (18 - 19)  SpO2: 97% (28 Sep 2017 04:40) (93% - 97%)      PHYSICAL EXAM:  Frail elderly lady sitting in a chair.  Constitutional: Comfortable . No acute distress.   HEENT: Atraumatic and normcephalic , neck is supple . no JVD. No carotid bruit. PEERL   CNS: A&Ox3. No focal deficits. EOMI. Cranial nerves II-IX are intact.   Lymph Nodes: Cervical : Not palpable.  Respiratory: CTAB  Cardiovascular: S1, absent S2, RRR. DEREJE 3-4/6 murmur/rubs or gallop.  Gastrointestinal: Soft non-tender and non distended . +Bowel sounds.   Extremities: No edema.   Psychiatric: Calm . no agitation.  Skin: No skin rash/ulcers visualized to face, hands or feet.    Intake and output:     LABS:            ;p-BNP=    Urinalysis Basic - ( 26 Sep 2017 18:53 )    Color: x / Appearance: Clear / S.010 / pH: x  Gluc: x / Ketone: Trace  / Bili: Negative / Urobili: Negative mg/dL   Blood: x / Protein: 30 mg/dL / Nitrite: Negative   Leuk Esterase: Small / RBC: 3-5 /HPF / WBC 3-5   Sq Epi: x / Non Sq Epi: x / Bacteria: Few        ECG: Reviewed by me. - Normal Sinus Rhythm. No acute changes.    < from: TTE Echo Complete w/Doppler (17 @ 08:34) >   Summary:   1. Normal left ventricular internal cavity size.   2. Normal global left ventricular systolic function.   3. Left ventricular ejection fraction, by visual estimation, is 70 to   75%.   4. There is moderate concentric left ventricular hypertrophy.   5. Spectral Doppler shows impaired relaxation pattern of left   ventricular myocardial filling (Grade I diastolic dysfunction).   6. Moderately enlarged left atrium.   7. The right atrium is normal in size.   8. Normal right ventricular size and function.   9. Severe aortic valve stenosis.  10. Severe mitral annular calcification.  11. Thickening of the anterior and posterior mitral valve leaflets.  12. Mitral valve mean gradient is 4.0 mmHg consistent with mild mitral   stenosis.  13. Mild to moderate mitral valve regurgitation.  14. There is no evidence of pericardial effusion.  15. Cystic left liver lobe mass noted. Clinically correlate.     Lei Spears MD Electronically signed on 2017 at 2:07:29 PM    < from: CT Chest No Cont (17 @ 17:16) >  IMPRESSION:     Acute fractures of the anterior right third through sixth ribs. Acute   fractures of the right posterolateral ninth and 10th ribs.    Right pleural effusion.    Bibasilar atelectasis.    Spiculated nodule in the lingula suspicious for neoplasm..

## 2017-09-29 PROCEDURE — 99232 SBSQ HOSP IP/OBS MODERATE 35: CPT

## 2017-09-29 RX ORDER — ERTAPENEM SODIUM 1 G/1
1000 INJECTION, POWDER, LYOPHILIZED, FOR SOLUTION INTRAMUSCULAR; INTRAVENOUS EVERY 24 HOURS
Qty: 0 | Refills: 0 | Status: DISCONTINUED | OUTPATIENT
Start: 2017-09-29 | End: 2017-10-02

## 2017-09-29 RX ADMIN — Medication 3 MILLILITER(S): at 21:01

## 2017-09-29 RX ADMIN — LIDOCAINE 1 PATCH: 4 CREAM TOPICAL at 00:50

## 2017-09-29 RX ADMIN — GABAPENTIN 100 MILLIGRAM(S): 400 CAPSULE ORAL at 05:10

## 2017-09-29 RX ADMIN — CLOPIDOGREL BISULFATE 75 MILLIGRAM(S): 75 TABLET, FILM COATED ORAL at 12:04

## 2017-09-29 RX ADMIN — Medication 650 MILLIGRAM(S): at 13:00

## 2017-09-29 RX ADMIN — Medication 3 MILLILITER(S): at 09:19

## 2017-09-29 RX ADMIN — GABAPENTIN 100 MILLIGRAM(S): 400 CAPSULE ORAL at 15:50

## 2017-09-29 RX ADMIN — ERTAPENEM SODIUM 120 MILLIGRAM(S): 1 INJECTION, POWDER, LYOPHILIZED, FOR SOLUTION INTRAMUSCULAR; INTRAVENOUS at 15:52

## 2017-09-29 RX ADMIN — ATORVASTATIN CALCIUM 80 MILLIGRAM(S): 80 TABLET, FILM COATED ORAL at 21:11

## 2017-09-29 RX ADMIN — GABAPENTIN 100 MILLIGRAM(S): 400 CAPSULE ORAL at 21:11

## 2017-09-29 RX ADMIN — Medication 650 MILLIGRAM(S): at 06:37

## 2017-09-29 RX ADMIN — LISINOPRIL 10 MILLIGRAM(S): 2.5 TABLET ORAL at 05:10

## 2017-09-29 RX ADMIN — Medication 100 MILLIGRAM(S): at 12:03

## 2017-09-29 RX ADMIN — POLYETHYLENE GLYCOL 3350 17 GRAM(S): 17 POWDER, FOR SOLUTION ORAL at 05:09

## 2017-09-29 RX ADMIN — Medication 100 MILLIGRAM(S): at 05:10

## 2017-09-29 RX ADMIN — SERTRALINE 25 MILLIGRAM(S): 25 TABLET, FILM COATED ORAL at 12:04

## 2017-09-29 RX ADMIN — SENNA PLUS 2 TABLET(S): 8.6 TABLET ORAL at 21:11

## 2017-09-29 RX ADMIN — Medication 650 MILLIGRAM(S): at 12:03

## 2017-09-29 RX ADMIN — Medication 650 MILLIGRAM(S): at 05:10

## 2017-09-29 RX ADMIN — MIRTAZAPINE 15 MILLIGRAM(S): 45 TABLET, ORALLY DISINTEGRATING ORAL at 21:11

## 2017-09-29 RX ADMIN — LIDOCAINE 1 PATCH: 4 CREAM TOPICAL at 12:03

## 2017-09-29 RX ADMIN — ENOXAPARIN SODIUM 40 MILLIGRAM(S): 100 INJECTION SUBCUTANEOUS at 12:04

## 2017-09-29 RX ADMIN — Medication 3 MILLILITER(S): at 15:46

## 2017-09-29 RX ADMIN — AMLODIPINE BESYLATE 5 MILLIGRAM(S): 2.5 TABLET ORAL at 05:10

## 2017-09-30 DIAGNOSIS — S22.39XA FRACTURE OF ONE RIB, UNSPECIFIED SIDE, INITIAL ENCOUNTER FOR CLOSED FRACTURE: ICD-10-CM

## 2017-09-30 LAB
BASOPHILS # BLD AUTO: 0 K/UL — SIGNIFICANT CHANGE UP (ref 0–0.2)
BASOPHILS NFR BLD AUTO: 0.2 % — SIGNIFICANT CHANGE UP (ref 0–2)
EOSINOPHIL # BLD AUTO: 0.3 K/UL — SIGNIFICANT CHANGE UP (ref 0–0.5)
EOSINOPHIL NFR BLD AUTO: 4.8 % — SIGNIFICANT CHANGE UP (ref 0–6)
HCT VFR BLD CALC: 35.2 % — LOW (ref 37–47)
HGB BLD-MCNC: 11.6 G/DL — LOW (ref 12–16)
LYMPHOCYTES # BLD AUTO: 1 K/UL — SIGNIFICANT CHANGE UP (ref 1–4.8)
LYMPHOCYTES # BLD AUTO: 18.2 % — LOW (ref 20–55)
MCHC RBC-ENTMCNC: 27.5 PG — SIGNIFICANT CHANGE UP (ref 27–31)
MCHC RBC-ENTMCNC: 33 G/DL — SIGNIFICANT CHANGE UP (ref 32–36)
MCV RBC AUTO: 83.4 FL — SIGNIFICANT CHANGE UP (ref 81–99)
MONOCYTES # BLD AUTO: 0.6 K/UL — SIGNIFICANT CHANGE UP (ref 0–0.8)
MONOCYTES NFR BLD AUTO: 10.6 % — HIGH (ref 3–10)
NEUTROPHILS # BLD AUTO: 3.5 K/UL — SIGNIFICANT CHANGE UP (ref 1.8–8)
NEUTROPHILS NFR BLD AUTO: 66 % — SIGNIFICANT CHANGE UP (ref 37–73)
PLATELET # BLD AUTO: 188 K/UL — SIGNIFICANT CHANGE UP (ref 150–400)
RBC # BLD: 4.22 M/UL — LOW (ref 4.4–5.2)
RBC # FLD: 14.4 % — SIGNIFICANT CHANGE UP (ref 11–15.6)
WBC # BLD: 5.4 K/UL — SIGNIFICANT CHANGE UP (ref 4.8–10.8)
WBC # FLD AUTO: 5.4 K/UL — SIGNIFICANT CHANGE UP (ref 4.8–10.8)

## 2017-09-30 PROCEDURE — 99231 SBSQ HOSP IP/OBS SF/LOW 25: CPT

## 2017-09-30 RX ADMIN — Medication 3 MILLILITER(S): at 15:04

## 2017-09-30 RX ADMIN — Medication 650 MILLIGRAM(S): at 18:00

## 2017-09-30 RX ADMIN — MIRTAZAPINE 15 MILLIGRAM(S): 45 TABLET, ORALLY DISINTEGRATING ORAL at 21:02

## 2017-09-30 RX ADMIN — LIDOCAINE 1 PATCH: 4 CREAM TOPICAL at 00:03

## 2017-09-30 RX ADMIN — ENOXAPARIN SODIUM 40 MILLIGRAM(S): 100 INJECTION SUBCUTANEOUS at 11:17

## 2017-09-30 RX ADMIN — Medication 400 MILLIGRAM(S): at 15:57

## 2017-09-30 RX ADMIN — GABAPENTIN 100 MILLIGRAM(S): 400 CAPSULE ORAL at 05:41

## 2017-09-30 RX ADMIN — Medication 650 MILLIGRAM(S): at 11:17

## 2017-09-30 RX ADMIN — SERTRALINE 25 MILLIGRAM(S): 25 TABLET, FILM COATED ORAL at 11:18

## 2017-09-30 RX ADMIN — Medication 100 MILLIGRAM(S): at 21:02

## 2017-09-30 RX ADMIN — Medication 650 MILLIGRAM(S): at 06:30

## 2017-09-30 RX ADMIN — GABAPENTIN 100 MILLIGRAM(S): 400 CAPSULE ORAL at 14:07

## 2017-09-30 RX ADMIN — Medication 400 MILLIGRAM(S): at 16:52

## 2017-09-30 RX ADMIN — LIDOCAINE 1 PATCH: 4 CREAM TOPICAL at 11:18

## 2017-09-30 RX ADMIN — Medication 3 MILLILITER(S): at 08:29

## 2017-09-30 RX ADMIN — GABAPENTIN 100 MILLIGRAM(S): 400 CAPSULE ORAL at 21:02

## 2017-09-30 RX ADMIN — AMLODIPINE BESYLATE 5 MILLIGRAM(S): 2.5 TABLET ORAL at 05:40

## 2017-09-30 RX ADMIN — Medication 650 MILLIGRAM(S): at 17:10

## 2017-09-30 RX ADMIN — LISINOPRIL 10 MILLIGRAM(S): 2.5 TABLET ORAL at 05:41

## 2017-09-30 RX ADMIN — ATORVASTATIN CALCIUM 80 MILLIGRAM(S): 80 TABLET, FILM COATED ORAL at 21:02

## 2017-09-30 RX ADMIN — Medication 650 MILLIGRAM(S): at 13:18

## 2017-09-30 RX ADMIN — POLYETHYLENE GLYCOL 3350 17 GRAM(S): 17 POWDER, FOR SOLUTION ORAL at 17:10

## 2017-09-30 RX ADMIN — Medication 650 MILLIGRAM(S): at 05:41

## 2017-09-30 RX ADMIN — ERTAPENEM SODIUM 120 MILLIGRAM(S): 1 INJECTION, POWDER, LYOPHILIZED, FOR SOLUTION INTRAMUSCULAR; INTRAVENOUS at 15:11

## 2017-09-30 RX ADMIN — Medication 100 MILLIGRAM(S): at 11:17

## 2017-09-30 RX ADMIN — Medication 200 MILLIGRAM(S): at 17:10

## 2017-09-30 RX ADMIN — CLOPIDOGREL BISULFATE 75 MILLIGRAM(S): 75 TABLET, FILM COATED ORAL at 11:17

## 2017-09-30 RX ADMIN — SENNA PLUS 2 TABLET(S): 8.6 TABLET ORAL at 21:02

## 2017-09-30 RX ADMIN — POLYETHYLENE GLYCOL 3350 17 GRAM(S): 17 POWDER, FOR SOLUTION ORAL at 05:40

## 2017-09-30 NOTE — PROGRESS NOTE ADULT - PROBLEM SELECTOR PLAN 1
- daily PIC score  - pain control as needed  - encourage IS use, deep breathing  - continue chest physiotherapy and nebulizer  - OOB as tolerated  - continue Invanz for 3 day course for UTI  - PT/OT  - dispo planning for early next week pending improvement in respiratory function

## 2017-10-01 RX ADMIN — AMLODIPINE BESYLATE 5 MILLIGRAM(S): 2.5 TABLET ORAL at 05:18

## 2017-10-01 RX ADMIN — POLYETHYLENE GLYCOL 3350 17 GRAM(S): 17 POWDER, FOR SOLUTION ORAL at 05:19

## 2017-10-01 RX ADMIN — GABAPENTIN 100 MILLIGRAM(S): 400 CAPSULE ORAL at 22:40

## 2017-10-01 RX ADMIN — Medication 650 MILLIGRAM(S): at 00:50

## 2017-10-01 RX ADMIN — SERTRALINE 25 MILLIGRAM(S): 25 TABLET, FILM COATED ORAL at 11:47

## 2017-10-01 RX ADMIN — SENNA PLUS 2 TABLET(S): 8.6 TABLET ORAL at 22:40

## 2017-10-01 RX ADMIN — CLOPIDOGREL BISULFATE 75 MILLIGRAM(S): 75 TABLET, FILM COATED ORAL at 11:46

## 2017-10-01 RX ADMIN — LIDOCAINE 1 PATCH: 4 CREAM TOPICAL at 00:48

## 2017-10-01 RX ADMIN — Medication 200 MILLIGRAM(S): at 18:03

## 2017-10-01 RX ADMIN — ATORVASTATIN CALCIUM 80 MILLIGRAM(S): 80 TABLET, FILM COATED ORAL at 22:40

## 2017-10-01 RX ADMIN — Medication 650 MILLIGRAM(S): at 05:23

## 2017-10-01 RX ADMIN — LIDOCAINE 1 PATCH: 4 CREAM TOPICAL at 22:43

## 2017-10-01 RX ADMIN — Medication 650 MILLIGRAM(S): at 19:33

## 2017-10-01 RX ADMIN — Medication 650 MILLIGRAM(S): at 23:21

## 2017-10-01 RX ADMIN — Medication 100 MILLIGRAM(S): at 11:46

## 2017-10-01 RX ADMIN — ENOXAPARIN SODIUM 40 MILLIGRAM(S): 100 INJECTION SUBCUTANEOUS at 11:47

## 2017-10-01 RX ADMIN — Medication 3 MILLILITER(S): at 20:38

## 2017-10-01 RX ADMIN — Medication 650 MILLIGRAM(S): at 11:46

## 2017-10-01 RX ADMIN — Medication 3 MILLILITER(S): at 16:05

## 2017-10-01 RX ADMIN — GABAPENTIN 100 MILLIGRAM(S): 400 CAPSULE ORAL at 05:18

## 2017-10-01 RX ADMIN — Medication 3 MILLILITER(S): at 09:39

## 2017-10-01 RX ADMIN — Medication 200 MILLIGRAM(S): at 05:17

## 2017-10-01 RX ADMIN — Medication 650 MILLIGRAM(S): at 01:09

## 2017-10-01 RX ADMIN — LISINOPRIL 10 MILLIGRAM(S): 2.5 TABLET ORAL at 05:18

## 2017-10-01 RX ADMIN — Medication 200 MILLIGRAM(S): at 00:51

## 2017-10-01 RX ADMIN — Medication 100 MILLIGRAM(S): at 22:40

## 2017-10-01 RX ADMIN — POLYETHYLENE GLYCOL 3350 17 GRAM(S): 17 POWDER, FOR SOLUTION ORAL at 18:03

## 2017-10-01 RX ADMIN — MIRTAZAPINE 15 MILLIGRAM(S): 45 TABLET, ORALLY DISINTEGRATING ORAL at 22:41

## 2017-10-01 RX ADMIN — LIDOCAINE 1 PATCH: 4 CREAM TOPICAL at 11:49

## 2017-10-01 RX ADMIN — ERTAPENEM SODIUM 120 MILLIGRAM(S): 1 INJECTION, POWDER, LYOPHILIZED, FOR SOLUTION INTRAMUSCULAR; INTRAVENOUS at 14:53

## 2017-10-01 RX ADMIN — Medication 100 MILLIGRAM(S): at 05:16

## 2017-10-01 RX ADMIN — Medication 200 MILLIGRAM(S): at 11:47

## 2017-10-01 RX ADMIN — Medication 650 MILLIGRAM(S): at 12:42

## 2017-10-01 RX ADMIN — Medication 650 MILLIGRAM(S): at 05:18

## 2017-10-01 RX ADMIN — GABAPENTIN 100 MILLIGRAM(S): 400 CAPSULE ORAL at 14:18

## 2017-10-01 RX ADMIN — Medication 200 MILLIGRAM(S): at 22:43

## 2017-10-01 RX ADMIN — Medication 650 MILLIGRAM(S): at 22:41

## 2017-10-01 RX ADMIN — Medication 650 MILLIGRAM(S): at 18:02

## 2017-10-01 NOTE — PROGRESS NOTE ADULT - ATTENDING COMMENTS
Agree with above.  The patient is without chest pain.  She reports that she still has an intermittent non productive cough.  She has been having difficulty with the incentive spirometer.  Chest wall is non tender and without crepitus.  Abdomen is soft and non tender.  Continue with pain control, discharge planning.
Denies pain but has on going poor IS effort.  Repeat IS training and on going encouragement for continued use. Patient reports pain control is adequate. Add agent such as mucinex to help loosen secretions.  Needs physical therapy. Wean supplemental O2.  Completed course of abx for ESBL klebsiella UTI. Dispo planning.

## 2017-10-01 NOTE — PROGRESS NOTE ADULT - SUBJECTIVE AND OBJECTIVE BOX
INTERVAL HPI/OVERNIGHT EVENTS:    STATUS POST:      POST OPERATIVE DAY #:     SUBJECTIVE:  Pt seen and examined in the am. No overnight events. Pt pulling 500 on IS. Having a cough. States pain is controlled. Denies N/V/F/C.       MEDICATIONS  (STANDING):  lidocaine   Patch 1 Patch Transdermal daily  acetaminophen   Tablet. 650 milliGRAM(s) Oral every 6 hours  clopidogrel Tablet 75 milliGRAM(s) Oral daily  sertraline 25 milliGRAM(s) Oral daily  polyethylene glycol 3350 17 Gram(s) Oral two times a day  atorvastatin 80 milliGRAM(s) Oral at bedtime  amLODIPine   Tablet 5 milliGRAM(s) Oral daily  gabapentin 100 milliGRAM(s) Oral every 8 hours  lisinopril 10 milliGRAM(s) Oral daily  mirtazapine 15 milliGRAM(s) Oral at bedtime  enoxaparin Injectable 40 milliGRAM(s) SubCutaneous daily  ALBUTerol/ipratropium for Nebulization 3 milliLiter(s) Nebulizer every 6 hours  docusate sodium 100 milliGRAM(s) Oral three times a day  senna 2 Tablet(s) Oral at bedtime  ertapenem  IVPB 1000 milliGRAM(s) IV Intermittent every 24 hours  guaiFENesin   Syrup  (Sugar-Free) 200 milliGRAM(s) Oral every 6 hours    MEDICATIONS  (PRN):  ibuprofen  Tablet 400 milliGRAM(s) Oral every 4 hours PRN Mild pain (1-3)      Vital Signs Last 24 Hrs  T(C): 36.8 (30 Sep 2017 21:45), Max: 36.8 (30 Sep 2017 21:45)  T(F): 98.2 (30 Sep 2017 21:45), Max: 98.2 (30 Sep 2017 21:45)  HR: 80 (30 Sep 2017 21:45) (80 - 82)  BP: 114/72 (30 Sep 2017 21:45) (110/62 - 114/72)  BP(mean): --  RR: 18 (30 Sep 2017 21:45) (18 - 20)  SpO2: 95% (30 Sep 2017 21:45) (95% - 96%)    PHYSICAL EXAM:      Gen: NAD  Neurological:  No sensory/motor deficits  HEENT: PERRLA, EOMI  Respiratory: Breath Sounds equal bilaterally  Cardiovascular: Regular rate & rhythm, normal S1, S2; no murmurs, gallops or rubs  Gastrointestinal: Soft, non-tender, nondistended  Vascular: Equal and normal pulses: 2+ peripheral pulses throughout  Musculoskeletal: No joint pain, swelling or deformity; no limitation of movement  Skin: No rashes    I&O's Detail    30 Sep 2017 07:01  -  01 Oct 2017 07:00  --------------------------------------------------------  IN:    Oral Fluid: 720 mL  Total IN: 720 mL    OUT:    Voided: 1 mL  Total OUT: 1 mL    Total NET: 719 mL          LABS:                        11.6   5.4   )-----------( 188      ( 30 Sep 2017 06:54 )             35.2                 RADIOLOGY & ADDITIONAL STUDIES:
INTERVAL HPI/OVERNIGHT EVENTS:  Patient was seen and examined at bedside this AM.  No acute events overnight.   Reports pain is tolerable, has a strong cough  Educated on how to use IS, but still has trouble properly using it  Had a BM yesterday, tolerating diet, little nauseous  Denies fever, chills, vomiting, chest pain, SOB, dysuria, diarrhea, constpation        MEDICATIONS  (STANDING):  lidocaine   Patch 1 Patch Transdermal daily  acetaminophen   Tablet. 650 milliGRAM(s) Oral every 6 hours  clopidogrel Tablet 75 milliGRAM(s) Oral daily  sertraline 25 milliGRAM(s) Oral daily  polyethylene glycol 3350 17 Gram(s) Oral two times a day  atorvastatin 80 milliGRAM(s) Oral at bedtime  amLODIPine   Tablet 5 milliGRAM(s) Oral daily  gabapentin 100 milliGRAM(s) Oral every 8 hours  hydrochlorothiazide 25 milliGRAM(s) Oral daily  lisinopril 10 milliGRAM(s) Oral daily  mirtazapine 15 milliGRAM(s) Oral at bedtime  enoxaparin Injectable 40 milliGRAM(s) SubCutaneous daily  ALBUTerol/ipratropium for Nebulization 3 milliLiter(s) Nebulizer every 6 hours  docusate sodium 100 milliGRAM(s) Oral three times a day  senna 2 Tablet(s) Oral at bedtime  nitrofurantoin (MACROBID) 100 milliGRAM(s) Oral two times a day with meals    MEDICATIONS  (PRN):  ibuprofen  Tablet 400 milliGRAM(s) Oral every 4 hours PRN Mild pain (1-3)      Vital Signs Last 24 Hrs  T(C): 36.4 (28 Sep 2017 04:40), Max: 36.8 (27 Sep 2017 20:02)  T(F): 97.6 (28 Sep 2017 04:40), Max: 98.3 (27 Sep 2017 20:02)  HR: 76 (28 Sep 2017 08:20) (69 - 77)  BP: 143/72 (28 Sep 2017 04:40) (135/49 - 143/72)  BP(mean): --  RR: 18 (28 Sep 2017 04:40) (18 - 19)  SpO2: 97% (28 Sep 2017 04:40) (93% - 97%)    Physical Exam:  Gen: NAD  Neurological:  No sensory/motor deficits  HEENT: PERRLA, EOMI  Respiratory: Breath Sounds equal & CTA bilaterally, no accessory muscle use  Cardiovascular: Regular rate & rhythm, normal S1, S2; no murmurs, gallops or rubs  Gastrointestinal: Soft, non-tender, nondistended  Vascular: Equal and normal pulses:   2+ peripheral pulses throughout  Musculoskeletal: No joint pain, swelling or deformity; no limitation of movement  Skin: No rashes      I&O's Detail      LABS:                        13.7   9.5   )-----------( 209      ( 26 Sep 2017 14:32 )             43.3         136  |  97<L>  |  21.0<H>  ----------------------------<  82  4.1   |  25.0  |  0.83    Ca    8.7      26 Sep 2017 14:32    TPro  6.6  /  Alb  3.7  /  TBili  0.3<L>  /  DBili  x   /  AST  25  /  ALT  14  /  AlkPhos  81      PT/INR - ( 26 Sep 2017 14:35 )   PT: 12.3 sec;   INR: 1.12 ratio         PTT - ( 26 Sep 2017 14:35 )  PTT:28.7 sec  Urinalysis Basic - ( 26 Sep 2017 18:53 )    Color: x / Appearance: Clear / S.010 / pH: x  Gluc: x / Ketone: Trace  / Bili: Negative / Urobili: Negative mg/dL   Blood: x / Protein: 30 mg/dL / Nitrite: Negative   Leuk Esterase: Small / RBC: 3-5 /HPF / WBC 3-5   Sq Epi: x / Non Sq Epi: x / Bacteria: Few        RADIOLOGY & ADDITIONAL STUDIES:
INTERVAL HPI/OVERNIGHT EVENTS:  Pt seen and examined at bedside.  Complains only of feeling light headed every now and then, but states she doesn't feel like she will pass out.  Sometimes she struggles to take deep breaths. Cannot complete an adequate IS inspiration.  Denies fever, chills, CP, N/V/D      MEDICATIONS  (STANDING):  lidocaine   Patch 1 Patch Transdermal daily  acetaminophen   Tablet. 650 milliGRAM(s) Oral every 6 hours  clopidogrel Tablet 75 milliGRAM(s) Oral daily  sertraline 25 milliGRAM(s) Oral daily  polyethylene glycol 3350 17 Gram(s) Oral two times a day  atorvastatin 80 milliGRAM(s) Oral at bedtime  amLODIPine   Tablet 5 milliGRAM(s) Oral daily  gabapentin 100 milliGRAM(s) Oral every 8 hours  lisinopril 10 milliGRAM(s) Oral daily  mirtazapine 15 milliGRAM(s) Oral at bedtime  enoxaparin Injectable 40 milliGRAM(s) SubCutaneous daily  ALBUTerol/ipratropium for Nebulization 3 milliLiter(s) Nebulizer every 6 hours  docusate sodium 100 milliGRAM(s) Oral three times a day  senna 2 Tablet(s) Oral at bedtime  ertapenem  IVPB 1000 milliGRAM(s) IV Intermittent every 24 hours    MEDICATIONS  (PRN):  ibuprofen  Tablet 400 milliGRAM(s) Oral every 4 hours PRN Mild pain (1-3)      Vital Signs Last 24 Hrs  T(C): 36.6 (29 Sep 2017 09:30), Max: 36.7 (29 Sep 2017 04:47)  T(F): 97.9 (29 Sep 2017 09:30), Max: 98.1 (29 Sep 2017 04:47)  HR: 88 (29 Sep 2017 17:15) (71 - 89)  BP: 108/52 (29 Sep 2017 17:15) (104/64 - 144/63)  BP(mean): --  RR: 18 (29 Sep 2017 17:15) (17 - 20)  SpO2: 99% (29 Sep 2017 17:15) (94% - 99%)    Physical Exam:    Neurological: reports light headedness, No sensory/motor deficits    HEENT: PERRLA, EOMI, no drainage or redness    Neck: No bruits; no thyromegaly or nodules,  No JVD    Back: No tenderness, No deformity or limitation of movement    Respiratory: Breath Sounds equal & clear to auscultation, no accessory muscle use    Cardiovascular: Regular rate & rhythm, normal S1, S2; no murmurs, gallops or rubs    Gastrointestinal: Soft, non-tender, normal bowel sounds    Extremities: No peripheral edema, No cyanosis, clubbing     Vascular: Equal and normal pulses: 2+ peripheral pulses throughout    Musculoskeletal: No joint pain, swelling or deformity; no limitation of movement    Skin: No rashes      I&O's Detail    28 Sep 2017 07:01  -  29 Sep 2017 07:00  --------------------------------------------------------  IN:    Oral Fluid: 960 mL  Total IN: 960 mL    OUT:  Total OUT: 0 mL    Total NET: 960 mL          LABS:                RADIOLOGY & ADDITIONAL STUDIES:
INTERVAL HPI/OVERNIGHT EVENTS: ABG reveals mild hypoxia.  Now on 2 L NC.  Admits pain much improved and rates as a 3. Denies SOB, fever, chills, other CO or any new CO. Unable to comprehend function of incentive spirometry but can physically take deep breathe.  + strong cough.    SUBJECTIVE:  Flatus: [ ] YES [ ]   Bowel Movement: [ ] [ ] NO  Pain (0-10):            Pain Control Adequate: [ ] YES [ ]   Nausea: [ ] [ ] NO            Vomiting: [ ] [ ] NO  Diarrhea: [ ] [ ] NO         Constipation: [ ] [ ] NO     Chest Pain: [ ] [ ] NO    SOB:  [ ] [ ] NO    MEDICATIONS  (STANDING):  lidocaine   Patch 1 Patch Transdermal daily  acetaminophen   Tablet. 650 milliGRAM(s) Oral every 6 hours  clopidogrel Tablet 75 milliGRAM(s) Oral daily  sertraline 25 milliGRAM(s) Oral daily  polyethylene glycol 3350 17 Gram(s) Oral two times a day  atorvastatin 80 milliGRAM(s) Oral at bedtime  amLODIPine   Tablet 5 milliGRAM(s) Oral daily  gabapentin 100 milliGRAM(s) Oral every 8 hours  hydrochlorothiazide 25 milliGRAM(s) Oral daily  lisinopril 10 milliGRAM(s) Oral daily  mirtazapine 15 milliGRAM(s) Oral at bedtime  enoxaparin Injectable 40 milliGRAM(s) SubCutaneous daily  ALBUTerol/ipratropium for Nebulization 3 milliLiter(s) Nebulizer every 6 hours  ciprofloxacin     Tablet 500 milliGRAM(s) Oral every 12 hours  docusate sodium 100 milliGRAM(s) Oral three times a day  senna 2 Tablet(s) Oral at bedtime    MEDICATIONS  (PRN):  ibuprofen  Tablet 400 milliGRAM(s) Oral every 4 hours PRN Mild pain (1-3)      Vital Signs Last 24 Hrs  T(C): 36.6 (27 Sep 2017 11:56), Max: 36.9 (26 Sep 2017 22:45)  T(F): 97.9 (27 Sep 2017 11:56), Max: 98.4 (26 Sep 2017 22:45)  HR: 74 (27 Sep 2017 15:23) (65 - 79)  BP: 147/64 (27 Sep 2017 11:56) (104/49 - 147/64)  BP(mean): --  RR: 17 (27 Sep 2017 07:34) (17 - 20)  SpO2: 95% (27 Sep 2017 11:56) (94% - 98%)    PHYSICAL EXAM:     Gen:NAD, Well appearing, No cyanosis, Pallor.    Eyes: PERRL ~ 3mm, EOMI,     Neurological: A&Ox3, GCS 15, No focal deficit.     ENMT: Clear canals, clear throat.      Neck: Supple. NT AT, FROM no pain.  No JVD. No meningeal signs    Pulmonary: NAD, CTA, = BL .      Cardiovascular: RRR, S1, S2, No Murmurs, rubs or gallops noted.    Gastrointestinal:ND, Soft, NT.    Extremities: NT, AT, no edema, erythema or palpable cord noted.  FROM, = 2+ pulses throughout.      I&O's Detail      LABS:                        13.7   9.5   )-----------( 209      ( 26 Sep 2017 14:32 )             43.3         136  |  97<L>  |  21.0<H>  ----------------------------<  82  4.1   |  25.0  |  0.83    Ca    8.7      26 Sep 2017 14:32    TPro  6.6  /  Alb  3.7  /  TBili  0.3<L>  /  DBili  x   /  AST  25  /  ALT  14  /  AlkPhos  81      PT/INR - ( 26 Sep 2017 14:35 )   PT: 12.3 sec;   INR: 1.12 ratio         PTT - ( 26 Sep 2017 14:35 )  PTT:28.7 sec  Urinalysis Basic - ( 26 Sep 2017 18:53 )    Color: x / Appearance: Clear / S.010 / pH: x  Gluc: x / Ketone: Trace  / Bili: Negative / Urobili: Negative mg/dL   Blood: x / Protein: 30 mg/dL / Nitrite: Negative   Leuk Esterase: Small / RBC: 3-5 /HPF / WBC 3-5   Sq Epi: x / Non Sq Epi: x / Bacteria: Few        RADIOLOGY & ADDITIONAL STUDIES:
[facility]                                                        Office: [address] [address]                                                       Telephone: [phone]. Fax:[phone]                                                                             PROGRESS NOTE   Reason for follow up: Severe AS                            Overnight: No new events.   Update: No new events    Subjective: " Patient continues to have intermittent dizziness.   Complains of:   Review of symptoms: Cardiac:  No chest pain. No dyspnea. No palpitations.  Respiratory:no cough. No dyspnea  Gastrointestinal: No diarrhea. No abdominal pain. No bleeding.     Past medical history: No updates.   Chronic conditions:  Hypertension: controlled. CHF: Stable. CAD: Stable ischemic heart disease. DM: Stable;    	  Vitals:  T(C): 36.6 (09-29-17 @ 09:30), Max: 36.7 (09-29-17 @ 04:47)  HR: 89 (09-29-17 @ 09:30) (71 - 89)  BP: 104/64 (09-29-17 @ 09:30) (104/64 - 144/63)  RR: 18 (09-29-17 @ 09:30) (17 - 20)  SpO2: 98% (09-29-17 @ 09:30) (94% - 99%)  Wt(kg): --  I&O's Summary    28 Sep 2017 07:01  -  29 Sep 2017 07:00  --------------------------------------------------------  IN: 960 mL / OUT: 0 mL / NET: 960 mL      Weight (kg): 66.7 (09-26 @ 14:06)    PHYSICAL EXAM:  Appearance: Comfortable. No acute distress  HEENT:  Head and neck: Atraumatic. Normocephalic.  Normal oral mucosa, PERRL, Neck is supple. No JVD, No carotid bruit.   Neurologic: A & O x 3, no focal deficits. EOMI , Cranial nerves are intact.  Lymphatic: No cervical lymphadenopathy  Cardiovascular: Normal S1, Absent S2, DEREJE 3-4/6 c/w Severe AS rubs/gallops. No JVD, No edema  Respiratory: Lungs clear to auscultation  Gastrointestinal:  Soft, Non-tender, + BS  Lower Extremities: No edema  Psychiatry: Patient is calm. No agitation. Mood & affect appropriate  Skin: No rashes/ ecchymoses/cyanosis/ulcers visualized on the face, hands or feet.    CURRENT MEDICATIONS:  amLODIPine   Tablet 5 milliGRAM(s) Oral daily  lisinopril 10 milliGRAM(s) Oral daily    ALBUTerol/ipratropium for Nebulization  nitrofurantoin (MACROBID)  acetaminophen   Tablet.  sertraline  gabapentin  mirtazapine  polyethylene glycol 3350  docusate sodium  senna  atorvastatin  lidocaine   Patch  clopidogrel Tablet  enoxaparin Injectable      LABS:	 	  CARDIAC MARKERS ( 26 Sep 2017 14:32 )  x     / 0.05 ng/mL / x     / x     / x      p-BNP 26 Sep 2017 14:32: x                  proBNP:   Lipid Profile:   HgA1c: TSH: Thyroid Stimulating Hormone, Serum: 2.36 uIU/mL
INTERVAL HPI/OVERNIGHT EVENTS:  Patient was seen and examined at bedside this AM.  No acute events overnight. Patient continues to have pain with deep inspiration. Not working well with incentive spirometer. Chest physiotherapy and nebulizers as well as rib fracture protocol in place. Patient now beginning to cough to clear secretions. Has been OOB without issue. Tolerating diet without nausea or vomiting. No chest pain or shortness of breath. No fevers or chills. Patient switched to [facility] for antibiotic coverage for ESBL Klebsiella in the urine.      MEDICATIONS  (STANDING):  lidocaine   Patch 1 Patch Transdermal daily  acetaminophen   Tablet. 650 milliGRAM(s) Oral every 6 hours  clopidogrel Tablet 75 milliGRAM(s) Oral daily  sertraline 25 milliGRAM(s) Oral daily  polyethylene glycol 3350 17 Gram(s) Oral two times a day  atorvastatin 80 milliGRAM(s) Oral at bedtime  amLODIPine   Tablet 5 milliGRAM(s) Oral daily  gabapentin 100 milliGRAM(s) Oral every 8 hours  lisinopril 10 milliGRAM(s) Oral daily  mirtazapine 15 milliGRAM(s) Oral at bedtime  enoxaparin Injectable 40 milliGRAM(s) SubCutaneous daily  ALBUTerol/ipratropium for Nebulization 3 milliLiter(s) Nebulizer every 6 hours  docusate sodium 100 milliGRAM(s) Oral three times a day  senna 2 Tablet(s) Oral at bedtime  ertapenem  IVPB 1000 milliGRAM(s) IV Intermittent every 24 hours    MEDICATIONS  (PRN):  ibuprofen  Tablet 400 milliGRAM(s) Oral every 4 hours PRN Mild pain (1-3)      Vital Signs Last 24 Hrs  T(C): 37.1 (30 Sep 2017 04:35), Max: 37.1 (30 Sep 2017 04:35)  T(F): 98.7 (30 Sep 2017 04:35), Max: 98.7 (30 Sep 2017 04:35)  HR: 89 (30 Sep 2017 04:35) (82 - 89)  BP: 117/59 (30 Sep 2017 04:35) (108/52 - 117/59)  BP(mean): --  RR: 22 (30 Sep 2017 04:35) (18 - 22)  SpO2: 98% (30 Sep 2017 04:35) (98% - 99%)    Physical Exam:  Gen: NAD  Neurological:  No sensory/motor deficits  HEENT: PERRLA, EOMI  Respiratory: Breath Sounds equal bilaterally  Cardiovascular: Regular rate & rhythm, normal S1, S2; no murmurs, gallops or rubs  Gastrointestinal: Soft, non-tender, nondistended  Vascular: Equal and normal pulses: 2+ peripheral pulses throughout  Musculoskeletal: No joint pain, swelling or deformity; no limitation of movement  Skin: No rashes      I&O's Detail    29 Sep 2017 07:01  -  30 Sep 2017 07:00  --------------------------------------------------------  IN:    Oral Fluid: 600 mL  Total IN: 600 mL    OUT:  Total OUT: 0 mL    Total NET: 600 mL          LABS:                        11.6   5.4   )-----------( 188      ( 30 Sep 2017 06:54 )             35.2

## 2017-10-01 NOTE — PROGRESS NOTE ADULT - ASSESSMENT
82 y/o F s/p fall with rib fractures  -  daily PIC score  - pain control as needed  - encourage IS use, deep breathing  - continue chest physiotherapy and nebulizer  - OOB as tolerated  - continue Invanz till tomorrow   - PT/OT  - dispo planning for early next week pending improvement in respiratory function.
83 yoF s/p fall, possible syncope resulting in R 3-6 rib fractures and R 9-10 posterolateral ribs. 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
87 yo F SP fall from poorly explained "Dizziness" with multiple rib frx.  However, no associated injury, minimal O2 requirements, minimal pain, able to take deep breathe and strong cough.    Will GONZALEZ syncope further with TTE, Carotid duplex.    DC when syncope GONZALEZ Complete, pain well controlled, less O2 requirement and has safe dispo out of hospital.
Patient is an elderly 88 year old lady with past medical history of hypertension, meningioma, history of CVA, history of falls in the past, lives [facility](Assisted living facility or Nursing home) walks with the help of a walker admitted to the hospital because of dizziness and fall. Patients physical exam, labs reviewed.    1. Severe Aortic Stenosis- Mean gradient 42 mm Hg. LVEF 70-75%  2. HTN  3. Abnormal Chest CT scan suspicious for neoplasm.  4. Dizziness- ? Is it secondary to AS, unlikely.    Recommendations:  I had a long discussion with the patient's son (Tc), patients condition was discussed. Patients son stated that she is a DNR/DNI.   Patients son wants conservative management.  He understands that she is a high risk for interventions and I do concur with patients son.    No need for f/u in the office.     I spoke to [facility] [employer] PA.    I will sign off.
Pt is a 82 yo F w/ multiple rib fx s/p fall. Patient is doing well, continue with pain control and rib fx protocol. Antibiotics changed from cipro to Macrobid d/t UA growing ESBL.   - Regular w/ thin liquids diet  - Pain control  - Daily PIC Score  - Macrobid ABx  - Encourage IS
82 y/o F s/p fall with rib fractures

## 2017-10-02 ENCOUNTER — TRANSCRIPTION ENCOUNTER (OUTPATIENT)
Age: 82
End: 2017-10-02

## 2017-10-02 VITALS
HEART RATE: 93 BPM | TEMPERATURE: 97 F | RESPIRATION RATE: 18 BRPM | DIASTOLIC BLOOD PRESSURE: 55 MMHG | SYSTOLIC BLOOD PRESSURE: 95 MMHG | OXYGEN SATURATION: 96 %

## 2017-10-02 PROCEDURE — 92610 EVALUATE SWALLOWING FUNCTION: CPT

## 2017-10-02 PROCEDURE — 71045 X-RAY EXAM CHEST 1 VIEW: CPT

## 2017-10-02 PROCEDURE — 97116 GAIT TRAINING THERAPY: CPT

## 2017-10-02 PROCEDURE — 72125 CT NECK SPINE W/O DYE: CPT

## 2017-10-02 PROCEDURE — 87086 URINE CULTURE/COLONY COUNT: CPT

## 2017-10-02 PROCEDURE — 99285 EMERGENCY DEPT VISIT HI MDM: CPT | Mod: 25

## 2017-10-02 PROCEDURE — 84484 ASSAY OF TROPONIN QUANT: CPT

## 2017-10-02 PROCEDURE — 94760 N-INVAS EAR/PLS OXIMETRY 1: CPT

## 2017-10-02 PROCEDURE — 70450 CT HEAD/BRAIN W/O DYE: CPT

## 2017-10-02 PROCEDURE — 96372 THER/PROPH/DIAG INJ SC/IM: CPT | Mod: XU

## 2017-10-02 PROCEDURE — 97530 THERAPEUTIC ACTIVITIES: CPT

## 2017-10-02 PROCEDURE — 85610 PROTHROMBIN TIME: CPT

## 2017-10-02 PROCEDURE — 84443 ASSAY THYROID STIM HORMONE: CPT

## 2017-10-02 PROCEDURE — 94640 AIRWAY INHALATION TREATMENT: CPT

## 2017-10-02 PROCEDURE — 80053 COMPREHEN METABOLIC PANEL: CPT

## 2017-10-02 PROCEDURE — 85027 COMPLETE CBC AUTOMATED: CPT

## 2017-10-02 PROCEDURE — 93306 TTE W/DOPPLER COMPLETE: CPT

## 2017-10-02 PROCEDURE — 87186 SC STD MICRODIL/AGAR DIL: CPT

## 2017-10-02 PROCEDURE — 82803 BLOOD GASES ANY COMBINATION: CPT

## 2017-10-02 PROCEDURE — 93880 EXTRACRANIAL BILAT STUDY: CPT

## 2017-10-02 PROCEDURE — 97163 PT EVAL HIGH COMPLEX 45 MIN: CPT

## 2017-10-02 PROCEDURE — 71250 CT THORAX DX C-: CPT

## 2017-10-02 PROCEDURE — 36600 WITHDRAWAL OF ARTERIAL BLOOD: CPT

## 2017-10-02 PROCEDURE — 81001 URINALYSIS AUTO W/SCOPE: CPT

## 2017-10-02 PROCEDURE — 73502 X-RAY EXAM HIP UNI 2-3 VIEWS: CPT

## 2017-10-02 PROCEDURE — 96374 THER/PROPH/DIAG INJ IV PUSH: CPT

## 2017-10-02 PROCEDURE — 36415 COLL VENOUS BLD VENIPUNCTURE: CPT

## 2017-10-02 PROCEDURE — 93005 ELECTROCARDIOGRAM TRACING: CPT

## 2017-10-02 PROCEDURE — 85730 THROMBOPLASTIN TIME PARTIAL: CPT

## 2017-10-02 PROCEDURE — 97110 THERAPEUTIC EXERCISES: CPT

## 2017-10-02 RX ORDER — ACETAMINOPHEN 500 MG
2 TABLET ORAL
Qty: 0 | Refills: 0 | COMMUNITY
Start: 2017-10-02

## 2017-10-02 RX ORDER — IBUPROFEN 200 MG
1 TABLET ORAL
Qty: 0 | Refills: 0 | COMMUNITY
Start: 2017-10-02

## 2017-10-02 RX ORDER — LISINOPRIL 2.5 MG/1
1 TABLET ORAL
Qty: 0 | Refills: 0 | COMMUNITY
Start: 2017-10-02

## 2017-10-02 RX ORDER — SERTRALINE 25 MG/1
1 TABLET, FILM COATED ORAL
Qty: 0 | Refills: 0 | COMMUNITY
Start: 2017-10-02

## 2017-10-02 RX ORDER — ATORVASTATIN CALCIUM 80 MG/1
1 TABLET, FILM COATED ORAL
Qty: 0 | Refills: 0 | COMMUNITY
Start: 2017-10-02

## 2017-10-02 RX ORDER — LIDOCAINE 4 G/100G
1 CREAM TOPICAL
Qty: 0 | Refills: 0 | COMMUNITY
Start: 2017-10-02

## 2017-10-02 RX ORDER — GABAPENTIN 400 MG/1
1 CAPSULE ORAL
Qty: 0 | Refills: 0 | COMMUNITY
Start: 2017-10-02

## 2017-10-02 RX ORDER — MIRTAZAPINE 45 MG/1
1 TABLET, ORALLY DISINTEGRATING ORAL
Qty: 0 | Refills: 0 | COMMUNITY
Start: 2017-10-02

## 2017-10-02 RX ORDER — IPRATROPIUM/ALBUTEROL SULFATE 18-103MCG
3 AEROSOL WITH ADAPTER (GRAM) INHALATION
Qty: 0 | Refills: 0 | COMMUNITY
Start: 2017-10-02

## 2017-10-02 RX ORDER — AMLODIPINE BESYLATE 2.5 MG/1
1 TABLET ORAL
Qty: 0 | Refills: 0 | COMMUNITY
Start: 2017-10-02

## 2017-10-02 RX ORDER — CLOPIDOGREL BISULFATE 75 MG/1
1 TABLET, FILM COATED ORAL
Qty: 0 | Refills: 0 | COMMUNITY
Start: 2017-10-02

## 2017-10-02 RX ADMIN — ENOXAPARIN SODIUM 40 MILLIGRAM(S): 100 INJECTION SUBCUTANEOUS at 12:13

## 2017-10-02 RX ADMIN — POLYETHYLENE GLYCOL 3350 17 GRAM(S): 17 POWDER, FOR SOLUTION ORAL at 05:48

## 2017-10-02 RX ADMIN — Medication 650 MILLIGRAM(S): at 13:00

## 2017-10-02 RX ADMIN — SERTRALINE 25 MILLIGRAM(S): 25 TABLET, FILM COATED ORAL at 12:14

## 2017-10-02 RX ADMIN — GABAPENTIN 100 MILLIGRAM(S): 400 CAPSULE ORAL at 05:49

## 2017-10-02 RX ADMIN — Medication 650 MILLIGRAM(S): at 06:29

## 2017-10-02 RX ADMIN — Medication 650 MILLIGRAM(S): at 12:14

## 2017-10-02 RX ADMIN — LIDOCAINE 1 PATCH: 4 CREAM TOPICAL at 12:12

## 2017-10-02 RX ADMIN — LISINOPRIL 10 MILLIGRAM(S): 2.5 TABLET ORAL at 05:50

## 2017-10-02 RX ADMIN — GABAPENTIN 100 MILLIGRAM(S): 400 CAPSULE ORAL at 13:47

## 2017-10-02 RX ADMIN — AMLODIPINE BESYLATE 5 MILLIGRAM(S): 2.5 TABLET ORAL at 05:49

## 2017-10-02 RX ADMIN — Medication 200 MILLIGRAM(S): at 12:12

## 2017-10-02 RX ADMIN — Medication 3 MILLILITER(S): at 08:29

## 2017-10-02 RX ADMIN — Medication 100 MILLIGRAM(S): at 05:49

## 2017-10-02 RX ADMIN — Medication 650 MILLIGRAM(S): at 05:49

## 2017-10-02 RX ADMIN — Medication 3 MILLILITER(S): at 14:19

## 2017-10-02 RX ADMIN — Medication 200 MILLIGRAM(S): at 05:54

## 2017-10-02 RX ADMIN — CLOPIDOGREL BISULFATE 75 MILLIGRAM(S): 75 TABLET, FILM COATED ORAL at 12:13

## 2017-10-02 NOTE — DISCHARGE NOTE ADULT - MEDICATION SUMMARY - MEDICATIONS TO TAKE
I will START or STAY ON the medications listed below when I get home from the hospital:    acetaminophen 325 mg oral tablet  -- 2 tab(s) by mouth every 6 hours  -- Indication: For pain    ibuprofen 400 mg oral tablet  -- 1 tab(s) by mouth every 4 hours, As needed, Mild pain (1-3)  -- Indication: For pain    lisinopril 10 mg oral tablet  -- 1 tab(s) by mouth once a day  -- Indication: For as per pmd    gabapentin 100 mg oral capsule  -- 1 cap(s) by mouth every 8 hours  -- Indication: For pain    mirtazapine 15 mg oral tablet  -- 1 tab(s) by mouth once a day (at bedtime)  -- Indication: For as per pmd    sertraline 25 mg oral tablet  -- 1 tab(s) by mouth once a day  -- Indication: For as per pmd    atorvastatin 80 mg oral tablet  -- 1 tab(s) by mouth once a day (at bedtime)  -- Indication: For as per pmd    clopidogrel 75 mg oral tablet  -- 1 tab(s) by mouth once a day  -- Indication: For as per pmd    ipratropium-albuterol 0.5 mg-2.5 mg/3 mLinhalation solution  -- 3 milliliter(s) inhaled every 6 hours  -- Indication: For as per pmd    amLODIPine 5 mg oral tablet  -- 1 tab(s) by mouth once a day  -- Indication: For as per pmd    lidocaine 5% topical film  -- Apply on skin to affected area 2 times a day  -- Indication: For as per pmd    guaiFENesin 100 mg/5 mL oral liquid  -- 10 milliliter(s) by mouth every 6 hours  -- Indication: For as per pmd

## 2017-10-02 NOTE — DISCHARGE NOTE ADULT - CARE PLAN
Principal Discharge DX:	Rib fractures  Goal:	Alleviation of pain and symptoms  Instructions for follow-up, activity and diet:	Follow up: Please call and make an appointment with the [facility] 10-14 days after discharge. Also, please call and make an appointment with your primary care physician as per your usual schedule.   Activity: Please, limit activity and rest until follow up appointment.   Diet: Continue dysphagia 3, soft, thin liquids.  Medications: Please take all home medications as prescribed by your primary care doctor. Continue the pain medication that you were given during the hospital stay.  Please, take it as it has been prescribed, do not drive or operate heavy machinery while taking narcotics.  You are encouraged to take over-the-counter tylenol and/or ibuprofen for pain relief when you feel your pain no longer warrants the use of narcotic pain medications.  If confusion, altered mental status, fever, chest pain, shortness of breath, new or worsening chest pain, vomiting, change or worsening of medical status, please come back to the emergency room, and in case of emergency call 911.

## 2017-10-02 NOTE — DISCHARGE NOTE ADULT - PLAN OF CARE
Alleviation of pain and symptoms Follow up: Please call and make an appointment with the Acute [facility] Surgery [facility] 10-14 days after discharge. Also, please call and make an appointment with your primary care physician as per your usual schedule.   Activity: Please, limit activity and rest until follow up appointment.   Diet: Continue dysphagia 3, soft, thin liquids.  Medications: Please take all home medications as prescribed by your primary care doctor. Continue the pain medication that you were given during the hospital stay.  Please, take it as it has been prescribed, do not drive or operate heavy machinery while taking narcotics.  You are encouraged to take over-the-counter tylenol and/or ibuprofen for pain relief when you feel your pain no longer warrants the use of narcotic pain medications.  If confusion, altered mental status, fever, chest pain, shortness of breath, new or worsening chest pain, vomiting, change or worsening of medical status, please come back to the emergency room, and in case of emergency call 911.

## 2017-10-02 NOTE — DISCHARGE NOTE ADULT - CARE PROVIDER_API CALL
Jimmie Fitzgerald (DO), Surgery; Surgical Critical Care  [address]  [address]st [place] [address]  Phone: [phone]  Fax: [phone]

## 2017-10-02 NOTE — DISCHARGE NOTE ADULT - PATIENT PORTAL LINK FT
“You can access the [employer] Patient Portal, offered by [facility], by registering with the following website: [email or web address]”

## 2017-10-02 NOTE — DISCHARGE NOTE ADULT - HOSPITAL COURSE
HPI:  89 y/o F who presents to the ED after falling after feeling dizzy. Pt states she has never had this dizzy feeling before but did not black out or LOC. She states she hit her head. Denies any neck tenderness, changes of vision, CP, SOB. Complains of pain to the right chest and coccyx. She has a history of multiple falls where she is lives at [facility]. GCS 15. Pupils reactive b/l 4mm. Pulls 500cc on IS.   A: airway intact  B: clear equal b/l lungs  C: RRR, central pulses intact    CT head shows R ight posterior fossa mass, which is a mengioma which she is aware of  CT Neck No fracture  CT chest: R 3rd-6th rib fracture, R posteriorlateral 9-10 rib fracture, R pleural effusion, R nodule in the lingula (26 Sep 2017 19:45)    Pt was found to have a UTI, infected with Klebsiella pnumoniae ESBL.  She was treated with a course of antibiotics (Invanz) for 3 days.  Patient was kept for pain control and chest physiotherapy.  Pt tolerated the pulmonary toileting, used the incentive spirometer hourly and received nebulizers as well.  Supplemental oxygen was weaned off and pain was adequately controlled.  Patient is doing well and stable for discharge.

## 2017-10-17 PROBLEM — I63.9 CEREBRAL INFARCTION, UNSPECIFIED: Chronic | Status: ACTIVE | Noted: 2017-09-26

## 2017-10-17 PROBLEM — H33.20 SEROUS RETINAL DETACHMENT, UNSPECIFIED EYE: Chronic | Status: ACTIVE | Noted: 2017-09-26
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
## 2017-10-17 PROBLEM — I10 ESSENTIAL (PRIMARY) HYPERTENSION: Chronic | Status: ACTIVE | Noted: 2017-09-26

## 2017-10-20 ENCOUNTER — APPOINTMENT (OUTPATIENT)
Dept: HOME HEALTH SERVICES | Facility: HOME HEALTH | Age: 82
End: 2017-10-20

## 2017-11-10 ENCOUNTER — APPOINTMENT (OUTPATIENT)
Dept: HOME HEALTH SERVICES | Facility: HOME HEALTH | Age: 82
End: 2017-11-10

## 2017-11-10 VITALS
RESPIRATION RATE: 18 BRPM | OXYGEN SATURATION: 96 % | HEART RATE: 82 BPM | SYSTOLIC BLOOD PRESSURE: 122 MMHG | DIASTOLIC BLOOD PRESSURE: 68 MMHG | TEMPERATURE: 98.8 F

## 2017-11-10 RX ORDER — IPRATROPIUM BROMIDE 0.5 MG/2.5ML
0.02 SOLUTION RESPIRATORY (INHALATION)
Qty: 375 | Refills: 5 | Status: ACTIVE | COMMUNITY
Start: 2017-11-10

## 2017-11-10 RX ORDER — CAMPHOR AND MENTHOL 4; 16 G/100G; G/100G
0.025-5 LOTION TOPICAL EVERY 8 HOURS
Qty: 1 | Refills: 3 | Status: ACTIVE | COMMUNITY
Start: 2017-11-10

## 2017-11-10 RX ORDER — ALBUTEROL SULFATE 2.5 MG/3ML
(2.5 MG/3ML) SOLUTION RESPIRATORY (INHALATION)
Qty: 1 | Refills: 2 | Status: ACTIVE | COMMUNITY
Start: 2017-11-10

## 2017-11-10 RX ORDER — MIRTAZAPINE 15 MG/1
15 TABLET, FILM COATED ORAL
Qty: 90 | Refills: 3 | Status: DISCONTINUED | COMMUNITY
Start: 2017-08-08 | End: 2017-11-10

## 2017-11-10 RX ORDER — HYDROCHLOROTHIAZIDE 25 MG/1
25 TABLET ORAL
Qty: 90 | Refills: 0 | Status: DISCONTINUED | COMMUNITY
Start: 2016-12-30 | End: 2017-11-10

## 2017-11-10 RX ORDER — PREDNISOLONE ACETATE 10 MG/ML
1 SUSPENSION/ DROPS OPHTHALMIC
Qty: 1 | Refills: 0 | Status: ACTIVE | COMMUNITY
Start: 2017-11-10

## 2017-11-10 RX ORDER — IBUPROFEN 200 MG/1
200 TABLET, FILM COATED ORAL
Qty: 30 | Refills: 0 | Status: ACTIVE | COMMUNITY
Start: 2017-11-10

## 2017-11-10 RX ORDER — DEXTROMETHORPHAN HBR, GUAIFENESIN 20; 400 MG/20ML; MG/20ML
5-100 SOLUTION ORAL EVERY 6 HOURS
Qty: 1 | Refills: 0 | Status: ACTIVE | COMMUNITY
Start: 2017-11-10

## 2017-11-10 RX ORDER — ACETAMINOPHEN 325 MG/1
325 TABLET, FILM COATED ORAL
Qty: 1 | Refills: 5 | Status: ACTIVE | COMMUNITY
Start: 2017-11-10

## 2017-11-13 ENCOUNTER — MEDICATION RENEWAL (OUTPATIENT)
Age: 82
End: 2017-11-13

## 2017-11-17 ENCOUNTER — APPOINTMENT (OUTPATIENT)
Dept: HOME HEALTH SERVICES | Facility: HOME HEALTH | Age: 82
End: 2017-11-17
Payer: MEDICARE

## 2017-11-17 VITALS
TEMPERATURE: 97.5 F | OXYGEN SATURATION: 97 % | RESPIRATION RATE: 12 BRPM | HEART RATE: 67 BPM | DIASTOLIC BLOOD PRESSURE: 70 MMHG | SYSTOLIC BLOOD PRESSURE: 110 MMHG

## 2017-11-17 DIAGNOSIS — F01.50 VASCULAR DEMENTIA W/OUT BEHAVIORAL DISTURBANCE: ICD-10-CM

## 2017-11-17 DIAGNOSIS — S22.39XA FRACTURE OF ONE RIB, UNSPECIFIED SIDE, INITIAL ENCOUNTER FOR CLOSED FRACTURE: ICD-10-CM

## 2017-11-17 DIAGNOSIS — I10 ESSENTIAL (PRIMARY) HYPERTENSION: ICD-10-CM

## 2017-11-17 DIAGNOSIS — R11.0 NAUSEA: ICD-10-CM

## 2017-11-17 PROCEDURE — 99495 TRANSJ CARE MGMT MOD F2F 14D: CPT

## 2017-11-17 RX ORDER — ALENDRONATE SODIUM 70 MG/1
70 TABLET ORAL
Qty: 12 | Refills: 0 | Status: DISCONTINUED | COMMUNITY
Start: 2016-11-07 | End: 2017-11-17

## 2017-11-17 RX ORDER — DOCUSATE SODIUM 100 MG/1
100 CAPSULE ORAL 3 TIMES DAILY
Qty: 90 | Refills: 3 | Status: ACTIVE | COMMUNITY
Start: 2017-11-17 | End: 1900-01-01

## 2017-11-17 RX ORDER — SENNOSIDES 8.6 MG TABLETS 8.6 MG/1
8.6 TABLET ORAL
Qty: 60 | Refills: 1 | Status: ACTIVE | COMMUNITY
Start: 2017-11-17 | End: 1900-01-01

## 2017-11-17 RX ORDER — GABAPENTIN 100 MG/1
100 CAPSULE ORAL 3 TIMES DAILY
Qty: 90 | Refills: 3 | Status: ACTIVE | COMMUNITY
Start: 2016-12-30

## 2017-11-17 RX ORDER — AMLODIPINE BESYLATE 5 MG/1
5 TABLET ORAL DAILY
Qty: 30 | Refills: 4 | Status: ACTIVE | COMMUNITY
Start: 2017-08-11

## 2017-11-17 RX ORDER — SERTRALINE 25 MG/1
25 TABLET, FILM COATED ORAL DAILY
Qty: 30 | Refills: 9 | Status: ACTIVE | COMMUNITY
Start: 2017-08-08

## 2017-11-17 RX ORDER — SODIUM CHLORIDE 50 MG/ML
5 SOLUTION OPHTHALMIC
Qty: 1 | Refills: 0 | Status: ACTIVE | COMMUNITY
Start: 2017-11-10

## 2017-11-17 RX ORDER — LISINOPRIL 10 MG/1
10 TABLET ORAL DAILY
Qty: 90 | Refills: 3 | Status: ACTIVE | COMMUNITY
Start: 2017-08-08

## 2017-11-17 RX ORDER — CLOPIDOGREL BISULFATE 75 MG/1
75 TABLET, FILM COATED ORAL DAILY
Qty: 90 | Refills: 3 | Status: ACTIVE | COMMUNITY
Start: 2017-08-08

## 2017-11-17 RX ORDER — RANITIDINE 150 MG/1
150 TABLET ORAL
Qty: 60 | Refills: 6 | Status: ACTIVE | COMMUNITY
Start: 2017-08-08 | End: 1900-01-01

## 2017-11-20 ENCOUNTER — MEDICATION RENEWAL (OUTPATIENT)
Age: 82
End: 2017-11-20

## 2017-11-21 RX ORDER — ROSUVASTATIN CALCIUM 40 MG/1
40 TABLET, FILM COATED ORAL DAILY
Qty: 90 | Refills: 2 | Status: ACTIVE | COMMUNITY
Start: 2017-08-11 | End: 1900-01-01

## 2017-11-28 ENCOUNTER — CLINICAL ADVICE (OUTPATIENT)
Age: 82
End: 2017-11-28

## 2017-11-28 DIAGNOSIS — R29.6 REPEATED FALLS: ICD-10-CM

## 2017-12-01 PROBLEM — S22.39XA RIB FRACTURE: Status: ACTIVE | Noted: 2017-11-17

## 2017-12-01 PROBLEM — F01.50 DEMENTIA, VASCULAR: Status: ACTIVE | Noted: 2017-08-11

## 2017-12-04 ENCOUNTER — MEDICATION RENEWAL (OUTPATIENT)
Age: 82
End: 2017-12-04

## 2017-12-04 RX ORDER — LORATADINE 5 MG
17 TABLET,CHEWABLE ORAL
Qty: 1 | Refills: 5 | Status: ACTIVE | COMMUNITY
Start: 2017-08-11 | End: 1900-01-01

## 2017-12-05 ENCOUNTER — MEDICATION RENEWAL (OUTPATIENT)
Age: 82
End: 2017-12-05

## 2017-12-05 RX ORDER — CRANBERRY FRUIT EXTRACT 425 MG
425 CAPSULE ORAL
Qty: 30 | Refills: 4 | Status: ACTIVE | COMMUNITY
Start: 2017-11-10 | End: 1900-01-01

## 2017-12-05 RX ORDER — CHROMIUM 200 MCG
10 MCG TABLET ORAL
Qty: 60 | Refills: 4 | Status: ACTIVE | COMMUNITY
Start: 2017-11-10 | End: 1900-01-01

## 2017-12-05 RX ORDER — CHLORHEXIDINE GLUCONATE 4 %
400 (240 MG) LIQUID (ML) TOPICAL
Qty: 30 | Refills: 4 | Status: ACTIVE | COMMUNITY
Start: 2017-11-10 | End: 1900-01-01

## 2017-12-06 ENCOUNTER — MOBILE ON CALL (OUTPATIENT)
Age: 82
End: 2017-12-06

## 2017-12-11 ENCOUNTER — CLINICAL ADVICE (OUTPATIENT)
Age: 82
End: 2017-12-11

## 2017-12-11 ENCOUNTER — MEDICATION RENEWAL (OUTPATIENT)
Age: 82
End: 2017-12-11

## 2017-12-11 VITALS
HEART RATE: 78 BPM | HEIGHT: 64 IN | TEMPERATURE: 98 F | DIASTOLIC BLOOD PRESSURE: 74 MMHG | RESPIRATION RATE: 18 BRPM | SYSTOLIC BLOOD PRESSURE: 132 MMHG | WEIGHT: 110.01 LBS | OXYGEN SATURATION: 98 %

## 2017-12-11 DIAGNOSIS — K59.00 CONSTIPATION, UNSPECIFIED: ICD-10-CM

## 2017-12-11 LAB
ALBUMIN SERPL ELPH-MCNC: 3.6 G/DL — SIGNIFICANT CHANGE UP (ref 3.3–5.2)
ALP SERPL-CCNC: 88 U/L — SIGNIFICANT CHANGE UP (ref 40–120)
ALT FLD-CCNC: 8 U/L — SIGNIFICANT CHANGE UP
ANION GAP SERPL CALC-SCNC: 10 MMOL/L — SIGNIFICANT CHANGE UP (ref 5–17)
APTT BLD: 32.1 SEC — SIGNIFICANT CHANGE UP (ref 27.5–37.4)
AST SERPL-CCNC: 12 U/L — SIGNIFICANT CHANGE UP
BASOPHILS # BLD AUTO: 0 K/UL — SIGNIFICANT CHANGE UP (ref 0–0.2)
BASOPHILS NFR BLD AUTO: 0.4 % — SIGNIFICANT CHANGE UP (ref 0–2)
BILIRUB SERPL-MCNC: 0.4 MG/DL — SIGNIFICANT CHANGE UP (ref 0.4–2)
BUN SERPL-MCNC: 15 MG/DL — SIGNIFICANT CHANGE UP (ref 8–20)
CALCIUM SERPL-MCNC: 9 MG/DL — SIGNIFICANT CHANGE UP (ref 8.6–10.2)
CHLORIDE SERPL-SCNC: 101 MMOL/L — SIGNIFICANT CHANGE UP (ref 98–107)
CK SERPL-CCNC: 24 U/L — LOW (ref 25–170)
CO2 SERPL-SCNC: 29 MMOL/L — SIGNIFICANT CHANGE UP (ref 22–29)
CREAT SERPL-MCNC: 0.81 MG/DL — SIGNIFICANT CHANGE UP (ref 0.5–1.3)
EOSINOPHIL # BLD AUTO: 0.1 K/UL — SIGNIFICANT CHANGE UP (ref 0–0.5)
EOSINOPHIL NFR BLD AUTO: 1.6 % — SIGNIFICANT CHANGE UP (ref 0–6)
GLUCOSE SERPL-MCNC: 97 MG/DL — SIGNIFICANT CHANGE UP (ref 70–115)
HCT VFR BLD CALC: 36.4 % — LOW (ref 37–47)
HGB BLD-MCNC: 11.9 G/DL — LOW (ref 12–16)
INR BLD: 1.12 RATIO — SIGNIFICANT CHANGE UP (ref 0.88–1.16)
LACTATE BLDV-MCNC: 0.7 MMOL/L — SIGNIFICANT CHANGE UP (ref 0.5–2)
LYMPHOCYTES # BLD AUTO: 1.4 K/UL — SIGNIFICANT CHANGE UP (ref 1–4.8)
LYMPHOCYTES # BLD AUTO: 16 % — LOW (ref 20–55)
MAGNESIUM SERPL-MCNC: 1.9 MG/DL — SIGNIFICANT CHANGE UP (ref 1.6–2.6)
MCHC RBC-ENTMCNC: 27.4 PG — SIGNIFICANT CHANGE UP (ref 27–31)
MCHC RBC-ENTMCNC: 32.7 G/DL — SIGNIFICANT CHANGE UP (ref 32–36)
MCV RBC AUTO: 83.9 FL — SIGNIFICANT CHANGE UP (ref 81–99)
MONOCYTES # BLD AUTO: 0.9 K/UL — HIGH (ref 0–0.8)
MONOCYTES NFR BLD AUTO: 9.8 % — SIGNIFICANT CHANGE UP (ref 3–10)
NEUTROPHILS # BLD AUTO: 6.5 K/UL — SIGNIFICANT CHANGE UP (ref 1.8–8)
NEUTROPHILS NFR BLD AUTO: 72.1 % — SIGNIFICANT CHANGE UP (ref 37–73)
PLATELET # BLD AUTO: 291 K/UL — SIGNIFICANT CHANGE UP (ref 150–400)
POTASSIUM SERPL-MCNC: 4.2 MMOL/L — SIGNIFICANT CHANGE UP (ref 3.5–5.3)
POTASSIUM SERPL-SCNC: 4.2 MMOL/L — SIGNIFICANT CHANGE UP (ref 3.5–5.3)
PROT SERPL-MCNC: 6.4 G/DL — LOW (ref 6.6–8.7)
PROTHROM AB SERPL-ACNC: 12.3 SEC — SIGNIFICANT CHANGE UP (ref 9.8–12.7)
RBC # BLD: 4.34 M/UL — LOW (ref 4.4–5.2)
RBC # FLD: 14.1 % — SIGNIFICANT CHANGE UP (ref 11–15.6)
SODIUM SERPL-SCNC: 140 MMOL/L — SIGNIFICANT CHANGE UP (ref 135–145)
TROPONIN T SERPL-MCNC: <0.01 NG/ML — SIGNIFICANT CHANGE UP (ref 0–0.06)
WBC # BLD: 9 K/UL — SIGNIFICANT CHANGE UP (ref 4.8–10.8)
WBC # FLD AUTO: 9 K/UL — SIGNIFICANT CHANGE UP (ref 4.8–10.8)

## 2017-12-11 PROCEDURE — 71010: CPT | Mod: 26

## 2017-12-11 PROCEDURE — 70450 CT HEAD/BRAIN W/O DYE: CPT | Mod: 26

## 2017-12-11 RX ORDER — LACTULOSE 10 G/15ML
10 SOLUTION ORAL
Qty: 1 | Refills: 3 | Status: ACTIVE | COMMUNITY
Start: 2017-12-11 | End: 1900-01-01

## 2017-12-11 RX ORDER — PREDNISOLONE ACETATE 10 MG/ML
1 SUSPENSION/ DROPS OPHTHALMIC 4 TIMES DAILY
Qty: 5 | Refills: 4 | Status: ACTIVE | COMMUNITY
Start: 2017-05-02 | End: 1900-01-01

## 2017-12-11 RX ORDER — ONDANSETRON 4 MG/1
4 TABLET ORAL EVERY 8 HOURS
Qty: 90 | Refills: 4 | Status: ACTIVE | COMMUNITY
Start: 2017-09-01 | End: 1900-01-01

## 2017-12-11 RX ORDER — SODIUM CHLORIDE 9 MG/ML
1000 INJECTION INTRAMUSCULAR; INTRAVENOUS; SUBCUTANEOUS ONCE
Qty: 0 | Refills: 0 | Status: COMPLETED | OUTPATIENT
Start: 2017-12-11 | End: 2017-12-11

## 2017-12-11 RX ADMIN — SODIUM CHLORIDE 500 MILLILITER(S): 9 INJECTION INTRAMUSCULAR; INTRAVENOUS; SUBCUTANEOUS at 23:46

## 2017-12-11 NOTE — ED PROVIDER NOTE - NEUROLOGICAL, MLM
Cranial nerves 2-12 intact however daughter states there is a R sided facial droop at rest. follows commands.

## 2017-12-11 NOTE — ED PROVIDER NOTE - PROGRESS NOTE DETAILS
Patient with continued headache. Further medication ordered. Labs and CT are as noted. Patient not a surgical candidate as per patient and family. Will admit for medical management and inpatient neurologic consultation.

## 2017-12-11 NOTE — ED PROVIDER NOTE - CARDIAC, MLM
Normal rate, regular rhythm.  Heart sounds S1, S2.  No murmurs, rubs or gallops. 3/6 systolic murmur.

## 2017-12-11 NOTE — ED PROVIDER NOTE - CARE PLAN
Principal Discharge DX:	Brain mass  Secondary Diagnosis:	TIA (transient ischemic attack)  Secondary Diagnosis:	Headache

## 2017-12-11 NOTE — ED ADULT TRIAGE NOTE - CHIEF COMPLAINT QUOTE
Patient arrived to ED today with c/o headache, hx of brain tumors, worsening gait, right sided facial droop, disorientation, and dizziness for the past three days.  Patient has history of strokes and severe dementia.  Patient is resident at [facility].  Daughter and Son-in-law speaking for patient.

## 2017-12-11 NOTE — ED PROVIDER NOTE - OBJECTIVE STATEMENT
This is 89 y/o F PMHx meningioma, stroke and dementia presents to ED c/o HA x few days. Associated Sx nausea, decreased PO intake and R leg weakness. Daughter reports pt lives at [facility], saw pt 5 days ago where she appeared well, saw pt again today, noted a slight facial droop and pt "appeared unwell." Pt had a heat CT for complaints of weakness and HA which showed meningioma had grown.   Neurologist: Dr. Watkins  PCP: Dr. Lui

## 2017-12-12 ENCOUNTER — INPATIENT (INPATIENT)
Facility: HOSPITAL | Age: 82
LOS: 3 days | DRG: 71 | End: 2017-12-16
Attending: HOSPITALIST | Admitting: STUDENT IN AN ORGANIZED HEALTH CARE EDUCATION/TRAINING PROGRAM
Payer: MEDICARE

## 2017-12-12 DIAGNOSIS — Z90.11 ACQUIRED ABSENCE OF RIGHT BREAST AND NIPPLE: Chronic | ICD-10-CM

## 2017-12-12 DIAGNOSIS — G93.9 DISORDER OF BRAIN, UNSPECIFIED: ICD-10-CM

## 2017-12-12 LAB
AMMONIA BLD-MCNC: 20 UMOL/L — SIGNIFICANT CHANGE UP (ref 11–55)
APPEARANCE UR: ABNORMAL
BACTERIA # UR AUTO: ABNORMAL
BILIRUB UR-MCNC: NEGATIVE — SIGNIFICANT CHANGE UP
COLOR SPEC: YELLOW — SIGNIFICANT CHANGE UP
COMMENT - URINE: SIGNIFICANT CHANGE UP
DIFF PNL FLD: ABNORMAL
EPI CELLS # UR: SIGNIFICANT CHANGE UP
GLUCOSE UR QL: NEGATIVE MG/DL — SIGNIFICANT CHANGE UP
HBA1C BLD-MCNC: 4.5 % — SIGNIFICANT CHANGE UP (ref 4–5.6)
KETONES UR-MCNC: NEGATIVE — SIGNIFICANT CHANGE UP
LEUKOCYTE ESTERASE UR-ACNC: ABNORMAL
NITRITE UR-MCNC: POSITIVE
PH UR: 7 — SIGNIFICANT CHANGE UP (ref 5–8)
PROT UR-MCNC: 30 MG/DL
RBC CASTS # UR COMP ASSIST: ABNORMAL /HPF (ref 0–4)
SP GR SPEC: 1 — LOW (ref 1.01–1.02)
UROBILINOGEN FLD QL: NEGATIVE MG/DL — SIGNIFICANT CHANGE UP
WBC UR QL: ABNORMAL

## 2017-12-12 PROCEDURE — 99223 1ST HOSP IP/OBS HIGH 75: CPT

## 2017-12-12 PROCEDURE — 99285 EMERGENCY DEPT VISIT HI MDM: CPT

## 2017-12-12 PROCEDURE — 93010 ELECTROCARDIOGRAM REPORT: CPT

## 2017-12-12 RX ORDER — ASPIRIN/CALCIUM CARB/MAGNESIUM 324 MG
300 TABLET ORAL ONCE
Qty: 0 | Refills: 0 | Status: COMPLETED | OUTPATIENT
Start: 2017-12-12 | End: 2017-12-12

## 2017-12-12 RX ORDER — ATORVASTATIN CALCIUM 80 MG/1
20 TABLET, FILM COATED ORAL AT BEDTIME
Qty: 0 | Refills: 0 | Status: DISCONTINUED | OUTPATIENT
Start: 2017-12-12 | End: 2017-12-14

## 2017-12-12 RX ORDER — GABAPENTIN 400 MG/1
100 CAPSULE ORAL EVERY 8 HOURS
Qty: 0 | Refills: 0 | Status: DISCONTINUED | OUTPATIENT
Start: 2017-12-12 | End: 2017-12-14

## 2017-12-12 RX ORDER — ACETAMINOPHEN 500 MG
650 TABLET ORAL ONCE
Qty: 0 | Refills: 0 | Status: COMPLETED | OUTPATIENT
Start: 2017-12-12 | End: 2017-12-12

## 2017-12-12 RX ORDER — SERTRALINE 25 MG/1
25 TABLET, FILM COATED ORAL DAILY
Qty: 0 | Refills: 0 | Status: DISCONTINUED | OUTPATIENT
Start: 2017-12-12 | End: 2017-12-14

## 2017-12-12 RX ORDER — MORPHINE SULFATE 50 MG/1
2 CAPSULE, EXTENDED RELEASE ORAL EVERY 4 HOURS
Qty: 0 | Refills: 0 | Status: DISCONTINUED | OUTPATIENT
Start: 2017-12-12 | End: 2017-12-14

## 2017-12-12 RX ORDER — LISINOPRIL 2.5 MG/1
10 TABLET ORAL DAILY
Qty: 0 | Refills: 0 | Status: DISCONTINUED | OUTPATIENT
Start: 2017-12-12 | End: 2017-12-14

## 2017-12-12 RX ORDER — ENOXAPARIN SODIUM 100 MG/ML
40 INJECTION SUBCUTANEOUS DAILY
Qty: 0 | Refills: 0 | Status: DISCONTINUED | OUTPATIENT
Start: 2017-12-12 | End: 2017-12-12

## 2017-12-12 RX ORDER — MORPHINE SULFATE 50 MG/1
2 CAPSULE, EXTENDED RELEASE ORAL EVERY 6 HOURS
Qty: 0 | Refills: 0 | Status: DISCONTINUED | OUTPATIENT
Start: 2017-12-12 | End: 2017-12-12

## 2017-12-12 RX ORDER — ONDANSETRON 8 MG/1
4 TABLET, FILM COATED ORAL EVERY 6 HOURS
Qty: 0 | Refills: 0 | Status: DISCONTINUED | OUTPATIENT
Start: 2017-12-12 | End: 2017-12-16

## 2017-12-12 RX ORDER — ACETAMINOPHEN 500 MG
750 TABLET ORAL ONCE
Qty: 0 | Refills: 0 | Status: COMPLETED | OUTPATIENT
Start: 2017-12-12 | End: 2017-12-12

## 2017-12-12 RX ORDER — CEFTRIAXONE 500 MG/1
1 INJECTION, POWDER, FOR SOLUTION INTRAMUSCULAR; INTRAVENOUS EVERY 24 HOURS
Qty: 0 | Refills: 0 | Status: DISCONTINUED | OUTPATIENT
Start: 2017-12-13 | End: 2017-12-14

## 2017-12-12 RX ORDER — SODIUM CHLORIDE 9 MG/ML
1000 INJECTION INTRAMUSCULAR; INTRAVENOUS; SUBCUTANEOUS
Qty: 0 | Refills: 0 | Status: COMPLETED | OUTPATIENT
Start: 2017-12-12 | End: 2017-12-12

## 2017-12-12 RX ORDER — IBUPROFEN 200 MG
400 TABLET ORAL EVERY 4 HOURS
Qty: 0 | Refills: 0 | Status: DISCONTINUED | OUTPATIENT
Start: 2017-12-12 | End: 2017-12-14

## 2017-12-12 RX ORDER — ENOXAPARIN SODIUM 100 MG/ML
30 INJECTION SUBCUTANEOUS DAILY
Qty: 0 | Refills: 0 | Status: DISCONTINUED | OUTPATIENT
Start: 2017-12-12 | End: 2017-12-14

## 2017-12-12 RX ORDER — AMLODIPINE BESYLATE 2.5 MG/1
5 TABLET ORAL DAILY
Qty: 0 | Refills: 0 | Status: DISCONTINUED | OUTPATIENT
Start: 2017-12-12 | End: 2017-12-14

## 2017-12-12 RX ORDER — CEFTRIAXONE 500 MG/1
INJECTION, POWDER, FOR SOLUTION INTRAMUSCULAR; INTRAVENOUS
Qty: 0 | Refills: 0 | Status: DISCONTINUED | OUTPATIENT
Start: 2017-12-12 | End: 2017-12-14

## 2017-12-12 RX ORDER — ACETAMINOPHEN 500 MG
650 TABLET ORAL EVERY 6 HOURS
Qty: 0 | Refills: 0 | Status: DISCONTINUED | OUTPATIENT
Start: 2017-12-12 | End: 2017-12-14

## 2017-12-12 RX ORDER — MIRTAZAPINE 45 MG/1
15 TABLET, ORALLY DISINTEGRATING ORAL AT BEDTIME
Qty: 0 | Refills: 0 | Status: DISCONTINUED | OUTPATIENT
Start: 2017-12-12 | End: 2017-12-14

## 2017-12-12 RX ORDER — MORPHINE SULFATE 50 MG/1
4 CAPSULE, EXTENDED RELEASE ORAL EVERY 4 HOURS
Qty: 0 | Refills: 0 | Status: DISCONTINUED | OUTPATIENT
Start: 2017-12-12 | End: 2017-12-14

## 2017-12-12 RX ORDER — CEFTRIAXONE 500 MG/1
1 INJECTION, POWDER, FOR SOLUTION INTRAMUSCULAR; INTRAVENOUS ONCE
Qty: 0 | Refills: 0 | Status: COMPLETED | OUTPATIENT
Start: 2017-12-12 | End: 2017-12-12

## 2017-12-12 RX ORDER — ONDANSETRON 8 MG/1
4 TABLET, FILM COATED ORAL EVERY 6 HOURS
Qty: 0 | Refills: 0 | Status: DISCONTINUED | OUTPATIENT
Start: 2017-12-12 | End: 2017-12-12

## 2017-12-12 RX ORDER — MORPHINE SULFATE 50 MG/1
2 CAPSULE, EXTENDED RELEASE ORAL ONCE
Qty: 0 | Refills: 0 | Status: DISCONTINUED | OUTPATIENT
Start: 2017-12-12 | End: 2017-12-12

## 2017-12-12 RX ORDER — METOPROLOL TARTRATE 50 MG
5 TABLET ORAL ONCE
Qty: 0 | Refills: 0 | Status: COMPLETED | OUTPATIENT
Start: 2017-12-12 | End: 2017-12-12

## 2017-12-12 RX ORDER — LIDOCAINE 4 G/100G
1 CREAM TOPICAL DAILY
Qty: 0 | Refills: 0 | Status: DISCONTINUED | OUTPATIENT
Start: 2017-12-12 | End: 2017-12-16

## 2017-12-12 RX ADMIN — MORPHINE SULFATE 2 MILLIGRAM(S): 50 CAPSULE, EXTENDED RELEASE ORAL at 07:32

## 2017-12-12 RX ADMIN — ATORVASTATIN CALCIUM 20 MILLIGRAM(S): 80 TABLET, FILM COATED ORAL at 22:18

## 2017-12-12 RX ADMIN — Medication 5 MILLIGRAM(S): at 09:23

## 2017-12-12 RX ADMIN — Medication 650 MILLIGRAM(S): at 00:36

## 2017-12-12 RX ADMIN — LISINOPRIL 10 MILLIGRAM(S): 2.5 TABLET ORAL at 12:07

## 2017-12-12 RX ADMIN — SERTRALINE 25 MILLIGRAM(S): 25 TABLET, FILM COATED ORAL at 12:08

## 2017-12-12 RX ADMIN — CEFTRIAXONE 100 GRAM(S): 500 INJECTION, POWDER, FOR SOLUTION INTRAMUSCULAR; INTRAVENOUS at 06:18

## 2017-12-12 RX ADMIN — MORPHINE SULFATE 2 MILLIGRAM(S): 50 CAPSULE, EXTENDED RELEASE ORAL at 16:06

## 2017-12-12 RX ADMIN — Medication 750 MILLIGRAM(S): at 09:30

## 2017-12-12 RX ADMIN — MORPHINE SULFATE 2 MILLIGRAM(S): 50 CAPSULE, EXTENDED RELEASE ORAL at 01:46

## 2017-12-12 RX ADMIN — Medication 300 MILLIGRAM(S): at 03:08

## 2017-12-12 RX ADMIN — MORPHINE SULFATE 2 MILLIGRAM(S): 50 CAPSULE, EXTENDED RELEASE ORAL at 22:19

## 2017-12-12 RX ADMIN — GABAPENTIN 100 MILLIGRAM(S): 400 CAPSULE ORAL at 14:55

## 2017-12-12 RX ADMIN — MIRTAZAPINE 15 MILLIGRAM(S): 45 TABLET, ORALLY DISINTEGRATING ORAL at 22:18

## 2017-12-12 RX ADMIN — Medication 650 MILLIGRAM(S): at 21:13

## 2017-12-12 RX ADMIN — MORPHINE SULFATE 2 MILLIGRAM(S): 50 CAPSULE, EXTENDED RELEASE ORAL at 23:00

## 2017-12-12 RX ADMIN — LIDOCAINE 1 PATCH: 4 CREAM TOPICAL at 14:55

## 2017-12-12 RX ADMIN — ENOXAPARIN SODIUM 30 MILLIGRAM(S): 100 INJECTION SUBCUTANEOUS at 14:55

## 2017-12-12 RX ADMIN — MORPHINE SULFATE 2 MILLIGRAM(S): 50 CAPSULE, EXTENDED RELEASE ORAL at 02:16

## 2017-12-12 RX ADMIN — ONDANSETRON 4 MILLIGRAM(S): 8 TABLET, FILM COATED ORAL at 16:07

## 2017-12-12 RX ADMIN — GABAPENTIN 100 MILLIGRAM(S): 400 CAPSULE ORAL at 06:18

## 2017-12-12 RX ADMIN — SODIUM CHLORIDE 70 MILLILITER(S): 9 INJECTION INTRAMUSCULAR; INTRAVENOUS; SUBCUTANEOUS at 03:08

## 2017-12-12 RX ADMIN — AMLODIPINE BESYLATE 5 MILLIGRAM(S): 2.5 TABLET ORAL at 12:07

## 2017-12-12 RX ADMIN — GABAPENTIN 100 MILLIGRAM(S): 400 CAPSULE ORAL at 22:18

## 2017-12-12 RX ADMIN — Medication 300 MILLIGRAM(S): at 09:23

## 2017-12-12 NOTE — ED ADULT NURSE REASSESSMENT NOTE - NS ED NURSE REASSESS COMMENT FT1
Dr. Victor called requesting pt to be downgraded , pt family requesting palliative care , awaiting further orders
LAte entry ; Pt received in the stretcher c/o headache , pt noticed to be hypertensive at this time, will call MD to obtain further orders, son at the bedside , will continue to monitor

## 2017-12-12 NOTE — CONSULT NOTE ADULT - SUBJECTIVE AND OBJECTIVE BOX
Chief Complaint: headache/failure to thrive    HPI: 88 yo F sent from assisted living facility with increasing headache/loss of appetite. PMH is + for unresected meningioma/old cva/right mastectomy for Ca with reconstruction/right hip fx/hpt. She has been falling recently and was admitted in the past with rib and pelvic fx's. An echo done at [facility] in Sept showed critical AS with Peak and mean gradients of 77 and 42 with hyperdynamic LV function. Pt denies MI/cp/syncope/pulmonary/renal/hepatic/heme or endocrine hx. No allergy. Remote smoker. No etoh.   ROS + for dizziness but no edema/orthopnea/cp/pnd. OP meds include: norvasc 5/lipitor 80/plavix/neurontin 100 tid/inhalers/zestril 10/sertraline and remeron.    PAST MEDICAL & SURGICAL HISTORY:  Breast cancer: rt. breast ca.  Meningioma  Fall  Stroke  Retinal detachment  Hypertension  History of right mastectomy: rt. mastectomy.      PREVIOUS DIAGNOSTIC TESTING:      ECHO  FINDINGS: see above    STRESS  FINDINGS:    CATHETERIZATION  FINDINGS:    MEDICATIONS  (STANDING):  atorvastatin 20 milliGRAM(s) Oral at bedtime  cefTRIAXone   IVPB      enoxaparin Injectable 40 milliGRAM(s) SubCutaneous daily  gabapentin 100 milliGRAM(s) Oral every 8 hours  mirtazapine 15 milliGRAM(s) Oral at bedtime  sertraline 25 milliGRAM(s) Oral daily    MEDICATIONS  (PRN):      FAMILY HISTORY:  No pertinent family history      SOCIAL HISTORY:    CIGARETTES: 0    ALCOHOL: 0    ROS: Negative other than as mentioned in HPI.    Vital Signs Last 24 Hrs  T(C): 37 (12 Dec 2017 08:04), Max: 37 (12 Dec 2017 08:04)  T(F): 98.6 (12 Dec 2017 08:04), Max: 98.6 (12 Dec 2017 08:04)  HR: 77 (12 Dec 2017 08:04) (73 - 78)  BP: 160/80 manually left arm (12 Dec 2017 08:04) (112/64 - 190/77)  BP(mean): --  RR: 14 flat (12 Dec 2017 08:04) (18 - 18)  SpO2: 98% (12 Dec 2017 08:04) (98% - 98%)    PHYSICAL EXAM:  General: Appears well developed, well nourished alert and cooperative. elderly afebrile F co headache and holding a rag over right frontal region  HEENT: Head; normocephalic, atraumatic.  Eyes;   Pupils reactive, cornea wnl.  Neck; Supple, no nodes adenopathy, no NVD or carotid bruit or thyromegaly.  CARDIOVASCULAR; 3/6 long systolic murmur without audible S2.. No rub, gallop or lift. Normal S1   LUNGS; No rales, rhonchi or wheeze. Normal breath sounds bilaterally.  ABDOMEN ; Soft, nontender without mass or organomegaly. bowel sounds normoactive.  EXTREMITIES; No clubbing, cyanosis or edema. Distal pulses wnl. ROM normal.  SKIN; warm and dry with normal turgor.  NEURO; Alert/oriented x 3/normal motor exam.   PSYCH; normal affect.            INTERPRETATION OF TELEMETRY:    ECG: SR qs v1-2 vpc's lad  ct of head enlarging posterior fossa mass.  I&O's Detail      LABS:                        11.9   9.0   )-----------( 291      ( 11 Dec 2017 22:51 )             36.4     12-    140  |  101  |  15.0  ----------------------------<  97  4.2   |  29.0  |  0.81    Ca    9.0      11 Dec 2017 22:51  Mg     1.9     12-11    TPro  6.4<L>  /  Alb  3.6  /  TBili  0.4  /  DBili  x   /  AST  12  /  ALT  8   /  AlkPhos  88  12-11    CARDIAC MARKERS ( 11 Dec 2017 22:51 )  x     / <0.01 ng/mL / 24 U/L / x     / x          PT/INR - ( 11 Dec 2017 22:51 )   PT: 12.3 sec;   INR: 1.12 ratio         PTT - ( 11 Dec 2017 22:51 )  PTT:32.1 sec  Urinalysis Basic - ( 12 Dec 2017 03:47 )    Color: Yellow / Appearance: Slightly Turbid / S.005 / pH: x  Gluc: x / Ketone: Negative  / Bili: Negative / Urobili: Negative mg/dL   Blood: x / Protein: 30 mg/dL / Nitrite: Positive   Leuk Esterase: Moderate / RBC: 3-5 /HPF / WBC 6-10   Sq Epi: x / Non Sq Epi: Few / Bacteria: Many      I&O's Summary      RADIOLOGY & ADDITIONAL STUDIES: Chief Complaint: headache/failure to thrive    HPI: 89 yo F sent from assisted living facility with increasing headache/loss of appetite. PMH is + for unresected meningioma/old cva/right mastectomy for Ca with reconstruction/right hip fx/hpt. She has been falling recently and was admitted in the past with rib and pelvic fx's. An echo done at [facility] in Sept showed critical AS with Peak and mean gradients of 77 and 42 with hyperdynamic LV function. Pt denies MI/cp/syncope/pulmonary/renal/hepatic/heme or endocrine hx. No allergy. Remote smoker. No etoh.   ROS + for dizziness but no edema/orthopnea/cp/pnd. OP meds include: norvasc 5/lipitor 80/plavix/neurontin 100 tid/inhalers/zestril 10/sertraline and remeron.    PAST MEDICAL & SURGICAL HISTORY:  Breast cancer: rt. breast ca.  Meningioma  Fall  Stroke  Retinal detachment  Hypertension  History of right mastectomy: rt. mastectomy.      PREVIOUS DIAGNOSTIC TESTING:      ECHO  FINDINGS: see above    STRESS  FINDINGS:    CATHETERIZATION  FINDINGS:    MEDICATIONS  (STANDING):  atorvastatin 20 milliGRAM(s) Oral at bedtime  cefTRIAXone   IVPB      enoxaparin Injectable 40 milliGRAM(s) SubCutaneous daily  gabapentin 100 milliGRAM(s) Oral every 8 hours  mirtazapine 15 milliGRAM(s) Oral at bedtime  sertraline 25 milliGRAM(s) Oral daily    MEDICATIONS  (PRN):      FAMILY HISTORY:  No pertinent family history      SOCIAL HISTORY:    CIGARETTES: 0    ALCOHOL: 0    ROS: Negative other than as mentioned in HPI.    Vital Signs Last 24 Hrs  T(C): 37 (12 Dec 2017 08:04), Max: 37 (12 Dec 2017 08:04)  T(F): 98.6 (12 Dec 2017 08:04), Max: 98.6 (12 Dec 2017 08:04)  HR: 77 (12 Dec 2017 08:04) (73 - 78)  BP: 160/80 manually left arm (12 Dec 2017 08:04) (112/64 - 190/77)  BP(mean): --  RR: 14 flat (12 Dec 2017 08:04) (18 - 18)  SpO2: 98% (12 Dec 2017 08:04) (98% - 98%)    PHYSICAL EXAM:  General: Appears well developed, well nourished alert and cooperative. elderly afebrile F co headache and holding a rag over right frontal region  HEENT: Head; normocephalic, atraumatic.  Eyes;   Pupils reactive, cornea wnl.  Neck; Supple, no nodes adenopathy, no NVD or carotid bruit or thyromegaly.  CARDIOVASCULAR; 3/6 long systolic murmur without audible S2.. No rub, gallop or lift. Normal S1   LUNGS; No rales, rhonchi or wheeze. Normal breath sounds bilaterally.  ABDOMEN ; Soft, nontender without mass or organomegaly. bowel sounds normoactive.  EXTREMITIES; No clubbing, cyanosis or edema. Distal pulses wnl. ROM normal.  SKIN; warm and dry with normal turgor.  NEURO; Alert/oriented x 3/normal motor exam.   PSYCH; normal affect.            INTERPRETATION OF TELEMETRY:    ECG: SR qs v1-2 vpc's lad  ct of head enlarging posterior fossa mass.  I&O's Detail  cxr: rotated/cardiomeg.    LABS:                        11.9   9.0   )-----------( 291      ( 11 Dec 2017 22:51 )             36.4     12-    140  |  101  |  15.0  ----------------------------<  97  4.2   |  29.0  |  0.81    Ca    9.0      11 Dec 2017 22:51  Mg     1.9     12-    TPro  6.4<L>  /  Alb  3.6  /  TBili  0.4  /  DBili  x   /  AST  12  /  ALT  8   /  AlkPhos  88  12-    CARDIAC MARKERS ( 11 Dec 2017 22:51 )  x     / <0.01 ng/mL / 24 U/L / x     / x          PT/INR - ( 11 Dec 2017 22:51 )   PT: 12.3 sec;   INR: 1.12 ratio         PTT - ( 11 Dec 2017 22:51 )  PTT:32.1 sec  Urinalysis Basic - ( 12 Dec 2017 03:47 )    Color: Yellow / Appearance: Slightly Turbid / S.005 / pH: x  Gluc: x / Ketone: Negative  / Bili: Negative / Urobili: Negative mg/dL   Blood: x / Protein: 30 mg/dL / Nitrite: Positive   Leuk Esterase: Moderate / RBC: 3-5 /HPF / WBC 6-10   Sq Epi: x / Non Sq Epi: Few / Bacteria: Many      I&O's Summary      RADIOLOGY & ADDITIONAL STUDIES:

## 2017-12-12 NOTE — CHART NOTE - NSCHARTNOTEFT_GEN_A_CORE
patient is seen and evaluated during the rounds today, son at the bedside, patient has hx of meningioma, patient and family know about that, she is DNR and DNI, patient and family don't want any intervention, they wanted palliative and hospice care, palliative and hospice care consulted and MOLTs form filled along with DNR/DNI order put in, patient has been started on some pain meds.

## 2017-12-12 NOTE — GOALS OF CARE CONVERSATION - PERSONAL ADVANCE DIRECTIVE - CONVERSATION DETAILS
Mtg with son Liban Pinto to review plan of care   Son and daughter are both HCP.  Pt diagnosed many years prior with brain tumor.  Pt is awake but not alert.  Pt is complaining of severe head pain.  Family both verbalized that they would like MOLST form in place DNR/DNI comfort care only  Hospice eval also requested.  Dr Echavarria notified and placed orders for Advanced Directives and Hospice Eval.  Pt is also being medicated for pain with Morphine IV,  Pt is awaiting for Med Surg bed.  Palliative will follow as needed.

## 2017-12-12 NOTE — SWALLOW BEDSIDE ASSESSMENT ADULT - SWALLOW EVAL: RECOMMENDED FEEDING/EATING TECHNIQUES
allow for swallow between intakes/crush medication (when feasible)/position upright (90 degrees)/small sips/bites/oral hygiene

## 2017-12-12 NOTE — ED ADULT NURSE REASSESSMENT NOTE - COMFORT CARE
plan of care explained/repositioned/side rails up/warm blanket provided/treatment delay explained/wait time explained

## 2017-12-12 NOTE — ED ADULT NURSE NOTE - OBJECTIVE STATEMENT
[facility], able to make needs known. Patient came from [facility]. Noted with pink ID band on right wrist, as per patient, S/P mastectomy. As per triage note, Patient arrived to ED today with c/o headache, hx of brain tumors, worsening gait, right sided facial droop, disorientation, and dizziness for the past three days.  Patient has history of strokes and severe dementia.  Patient is resident at [facility] residence.  Daughter and Son-in-law speaking for patient. Patient noted without facial droop, disoriented to time/situation only, complains of headache and dizziness. Patient denies sob and chest pain.

## 2017-12-12 NOTE — H&P ADULT - NEUROLOGICAL DETAILS
sensation intact/deep reflexes intact/alert and oriented x 3/mild decrease in motor strength of upper/ lower ext on right.

## 2017-12-12 NOTE — H&P ADULT - ASSESSMENT
pt. is admitted for brain mass, MRI BRAIN WITH AND WITHOUT CONTRAST is ordered. day time to follow and further plan as per findings.     TIA unspecified. will keep on stroke protocol. as a 300 mg  per rectum given X1. FURTHER   PO MEDS AFTER SWALLOW EVAL. neuro consult dr. Watkins group.    UTI, site unspecified, no hematuria. will keep on rocephin.  follow urine cx.    Heart murmur , echo is ordered, will call cardiology consult. [place] cardiology consult.    follow bp closely.

## 2017-12-12 NOTE — CONSULT NOTE ADULT - SUBJECTIVE AND OBJECTIVE BOX
[facility]                                        Neurology at [place]                                  [employer]                                      [address]                                           [place], [place], [address]                                                [phone]    HISTORY:    The patient is a 89y Female with known brain tumor thought to be meningioma in posterior fossa.   Now brought in due to increasing headache, decreased activity, and decreased PO intake.     PAST MEDICAL & SURGICAL HISTORY:  Breast cancer: rt. breast ca.  Meningioma  Fall  Stroke  Retinal detachment  Hypertension  History of right mastectomy: rt. mastectomy.      MEDICATION PRIOR TO ADMISSION:  · 	guaiFENesin 100 mg/5 mL oral liquid: 10 milliliter(s) orally every 6 hours  · 	lidocaine 5% topical film: Apply topically to affected area 2 times a day  · 	amLODIPine 5 mg oral tablet: 1 tab(s) orally once a day  · 	ipratropium-albuterol 0.5 mg-2.5 mg/3 mLinhalation solution  · 	clopidogrel 75 mg oral tablet: 1 tab(s) orally once a day  · 	atorvastatin 80 mg oral tablet: 1 tab(s) orally once a day (at bedtime)  · 	sertraline 25 mg oral tablet: 1 tab(s) orally once a day   · 	mirtazapine 15 mg oral tablet: 1 tab(s) orally once a day (at bedtime)  · 	lisinopril 10 mg oral tablet: 1 tab(s) orally once a day  · 	gabapentin 100 mg oral capsule: 1 cap(s) orally every 8 hours   · 	ibuprofen 400 mg oral tablet: 1 tab(s) orally every 4 hours, As needed  · 	acetaminophen 325 mg oral tablet: 2 tab(s) orally every 6 hours      Allergies  No Known Allergies    SOCIAL HISTORY:  Non smoker.     FAMILY HISTORY:  No pertinent family history      ROS:  The patient denies fevers or weight changes.  Reports headache.  Denies chest pain.  Denies shortness of breath.  Denies abdominal pain, nausea, or vomiting.  Denies change in urinary pattern.  Denies rash.  Denies recent mood changes.    Exam:  Vital Signs Last 24 Hrs  T(F): 98.6 (12 Dec 2017 08:04), Max: 98.6 (12 Dec 2017 08:04)  HR: 77 (12 Dec 2017 08:04) (73 - 78)  BP: 190/77 (12 Dec 2017 08:04) (112/64 - 190/77)  RR: 18 (12 Dec 2017 08:04) (18 - 18)  SpO2: 98% (12 Dec 2017 08:04) (98% - 98%)  General: NAD.   Carotid bruits absent.     Mental status: The patient is awake, alert, and fully oriented. She answers questions and follows simple instructions.     Cranial nerves: There is no papilledema. Pupils react Symmetrically to light. There is no visual field deficit to confrontation. Extraocular motion is full with no nystagmus. There is no ptosis. Facial sensation is intact. Facial musculature is symmetric. Palate elevates symmetrically. Tongue is midline.    Motor: There is normal bulk and tone.  Strength is 5/5 in the right arm and leg.   Strength is 5/5 in the left arm and leg.    Sensation: Intact to light touch and pin.    Reflexes: 2+ throughout and plantar responses are flexor.    Cerebellar: There is right sided dysmetria on finger to nose testing.    LABS:                         11.9   9.0   )-----------( 291      ( 11 Dec 2017 22:51 )             36.4       12-11    140  |  101  |  15.0  ----------------------------<  97  4.2   |  29.0  |  0.81    Ca    9.0      11 Dec 2017 22:51  Mg     1.9     12-11    TPro  6.4<L>  /  Alb  3.6  /  TBili  0.4  /  DBili  x   /  AST  12  /  ALT  8   /  AlkPhos  88  12-11      PT/INR - ( 11 Dec 2017 22:51 )   PT: 12.3 sec;   INR: 1.12 ratio    PTT - ( 11 Dec 2017 22:51 )  PTT:32.1 sec    RADIOLOGY & ADDITIONAL STUDIES:  CT head shows right posterior fossa mass.

## 2017-12-12 NOTE — H&P ADULT - HISTORY OF PRESENT ILLNESS
89 y/o female was brought in for Headache which was strong and somewhat constant and as per pt. was frontal and possibly in the back of head too. Pt. has h/o meningioma and family is aware and they have decided not to go for any surgery and  to follow with neurologist . As per daughter pt. has not been feeling well for past few days and at her residence she was not eating or drinking enough. pt. reports urine burning. Pt's daughter also noticed mild rt. sided facial droop 1 day prior to ER visit, some slurring of speech. yesterday pt. was weak on her both legs  as well. Pt. has h/o vertigo. no fever. no abd. pain. no n/v. no cp. no sob. At the time of admission pt. is almost back to her baseline, daughter feels there is still mild rt. sided facial droop. Speech is clear now. 89 y/o female was brought in for Headache which was strong and somewhat constant and as per pt. was frontal and possibly in the back of head too. Pt. has h/o meningioma and family is aware and they have decided not to go for any surgery and  to follow with neurologist . As per daughter pt. has not been feeling well for past few days and at her residence she was not eating or drinking enough. pt. reports urine burning. Pt's daughter also noticed mild rt. sided facial droop 1 day prior to ER visit, some slurring of speech. yesterday pt. was weak on her both legs  as well. Pt. has h/o vertigo and cva in the past with mild rt. upper / lower ext. weakness.  no fever. no abd. pain. no n/v. no cp. no sob. At the time of admission pt. is almost back to her baseline, daughter feels there is still mild rt. sided facial droop. Speech is clear now.  Pt. did not pass swallow eval in ER was noted to choke on  2nd po tylenol tablet.

## 2017-12-12 NOTE — ED ADULT NURSE NOTE - CHIEF COMPLAINT QUOTE
Patient arrived to ED today with c/o headache, hx of brain tumors, worsening gait, right sided facial droop, disorientation, and dizziness for the past three days.  Patient has history of strokes and severe dementia.  Patient is resident at [facility].  Daughter and Son-in-law speaking for patient.

## 2017-12-13 LAB
CHOLEST SERPL-MCNC: 99 MG/DL — LOW (ref 110–199)
HDLC SERPL-MCNC: 33 MG/DL — LOW
LIPID PNL WITH DIRECT LDL SERPL: 49 MG/DL — SIGNIFICANT CHANGE UP
TOTAL CHOLESTEROL/HDL RATIO MEASUREMENT: 3 RATIO — LOW (ref 3.3–7.1)
TRIGL SERPL-MCNC: 87 MG/DL — SIGNIFICANT CHANGE UP (ref 10–200)

## 2017-12-13 PROCEDURE — 99232 SBSQ HOSP IP/OBS MODERATE 35: CPT

## 2017-12-13 RX ORDER — MORPHINE SULFATE 50 MG/1
2 CAPSULE, EXTENDED RELEASE ORAL ONCE
Qty: 0 | Refills: 0 | Status: DISCONTINUED | OUTPATIENT
Start: 2017-12-13 | End: 2017-12-14

## 2017-12-13 RX ORDER — MORPHINE SULFATE 50 MG/1
2 CAPSULE, EXTENDED RELEASE ORAL
Qty: 0 | Refills: 0 | Status: DISCONTINUED | OUTPATIENT
Start: 2017-12-13 | End: 2017-12-16

## 2017-12-13 RX ORDER — MORPHINE SULFATE 50 MG/1
4 CAPSULE, EXTENDED RELEASE ORAL
Qty: 0 | Refills: 0 | Status: DISCONTINUED | OUTPATIENT
Start: 2017-12-13 | End: 2017-12-16

## 2017-12-13 RX ORDER — GABAPENTIN 400 MG/1
100 CAPSULE ORAL EVERY 8 HOURS
Qty: 0 | Refills: 0 | Status: DISCONTINUED | OUTPATIENT
Start: 2017-12-13 | End: 2017-12-14

## 2017-12-13 RX ORDER — ASPIRIN/CALCIUM CARB/MAGNESIUM 324 MG
325 TABLET ORAL DAILY
Qty: 0 | Refills: 0 | Status: DISCONTINUED | OUTPATIENT
Start: 2017-12-13 | End: 2017-12-14

## 2017-12-13 RX ADMIN — MORPHINE SULFATE 4 MILLIGRAM(S): 50 CAPSULE, EXTENDED RELEASE ORAL at 14:58

## 2017-12-13 RX ADMIN — GABAPENTIN 100 MILLIGRAM(S): 400 CAPSULE ORAL at 22:41

## 2017-12-13 RX ADMIN — LIDOCAINE 1 PATCH: 4 CREAM TOPICAL at 05:40

## 2017-12-13 RX ADMIN — MORPHINE SULFATE 2 MILLIGRAM(S): 50 CAPSULE, EXTENDED RELEASE ORAL at 07:14

## 2017-12-13 RX ADMIN — LIDOCAINE 1 PATCH: 4 CREAM TOPICAL at 15:07

## 2017-12-13 RX ADMIN — AMLODIPINE BESYLATE 5 MILLIGRAM(S): 2.5 TABLET ORAL at 05:35

## 2017-12-13 RX ADMIN — GABAPENTIN 100 MILLIGRAM(S): 400 CAPSULE ORAL at 05:35

## 2017-12-13 RX ADMIN — MORPHINE SULFATE 4 MILLIGRAM(S): 50 CAPSULE, EXTENDED RELEASE ORAL at 19:00

## 2017-12-13 RX ADMIN — MORPHINE SULFATE 2 MILLIGRAM(S): 50 CAPSULE, EXTENDED RELEASE ORAL at 05:36

## 2017-12-13 RX ADMIN — GABAPENTIN 100 MILLIGRAM(S): 400 CAPSULE ORAL at 15:07

## 2017-12-13 RX ADMIN — MIRTAZAPINE 15 MILLIGRAM(S): 45 TABLET, ORALLY DISINTEGRATING ORAL at 22:41

## 2017-12-13 RX ADMIN — MORPHINE SULFATE 4 MILLIGRAM(S): 50 CAPSULE, EXTENDED RELEASE ORAL at 07:32

## 2017-12-13 RX ADMIN — ATORVASTATIN CALCIUM 20 MILLIGRAM(S): 80 TABLET, FILM COATED ORAL at 22:41

## 2017-12-13 RX ADMIN — MORPHINE SULFATE 4 MILLIGRAM(S): 50 CAPSULE, EXTENDED RELEASE ORAL at 18:44

## 2017-12-13 RX ADMIN — LISINOPRIL 10 MILLIGRAM(S): 2.5 TABLET ORAL at 05:35

## 2017-12-13 RX ADMIN — ENOXAPARIN SODIUM 30 MILLIGRAM(S): 100 INJECTION SUBCUTANEOUS at 15:07

## 2017-12-13 RX ADMIN — CEFTRIAXONE 100 GRAM(S): 500 INJECTION, POWDER, FOR SOLUTION INTRAMUSCULAR; INTRAVENOUS at 05:35

## 2017-12-13 RX ADMIN — SERTRALINE 25 MILLIGRAM(S): 25 TABLET, FILM COATED ORAL at 15:08

## 2017-12-13 NOTE — PROGRESS NOTE ADULT - SUBJECTIVE AND OBJECTIVE BOX
[facility]                                     Neurology at [place]                                 [employer]                                  [address]                                        [place], [place], [address]                                             [phone]      Vital signs:  T(C): 36.8 (12-13-17 @ 11:45), Max: 37.2 (12-13-17 @ 04:51)  HR: 78 (12-13-17 @ 11:45) (72 - 78)  BP: 150/70 (12-13-17 @ 11:45) (150/70 - 173/74)  RR: 18 (12-13-17 @ 11:45) (18 - 19)  SpO2: 95% (12-13-17 @ 11:45) (93% - 95%)  Wt(kg): --    Exam:    No new complaints.  Awake and alert.  speech language intact  Pupils react.  Face symmetric smile   diffusely weak  no drift  intact FT x 4 ext

## 2017-12-13 NOTE — PROGRESS NOTE ADULT - ASSESSMENT
Patient is admitted for brain mass, MRI BRAIN WITH AND WITHOUT CONTRAST is ordered. day time to follow and further plan as per findings.     TIA unspecified: will continue with aspirin, PO MEDS has been resumed as she passed swallow eval, neuro consult appreciated, patient has hx of meningioma, patient and family know about that, she is DNR and DNI, patient and family don't want any intervention, they wanted palliative and hospice care, MOLTs form filled along with DNR/DNI order put in, patient has been started on some pain meds, patient has been seen by Palliative and hospice team and was rejected from being inpatient hospice, will discuss with family if the wanted to do home with hospice      UTI, site unspecified, no hematuria. will keep on rocephin, she is poor historian, I dont know if she has symptoms, follow urine cx.    Heart murmur: no further work up as per family.     HTN: will continue with home meds, will monitor BP and adjust meds    SCDs

## 2017-12-13 NOTE — PROGRESS NOTE ADULT - SUBJECTIVE AND OBJECTIVE BOX
Chief Complaint: recent course & chart reviewed. Pt now dnr/i    HPI: Headache present but less. No dyspnea. No cp.    PAST MEDICAL & SURGICAL HISTORY:  Breast cancer: rt. breast ca.  Meningioma  Fall  Stroke  Retinal detachment  Hypertension  History of right mastectomy: rt. mastectomy.      PREVIOUS DIAGNOSTIC TESTING:      ECHO  FINDINGS: critical AS    STRESS  FINDINGS:    CATHETERIZATION  FINDINGS:    MEDICATIONS  (STANDING):  amLODIPine   Tablet 5 milliGRAM(s) Oral daily  atorvastatin 20 milliGRAM(s) Oral at bedtime  cefTRIAXone   IVPB 1 Gram(s) IV Intermittent every 24 hours  cefTRIAXone   IVPB      enoxaparin Injectable 30 milliGRAM(s) SubCutaneous daily  gabapentin 100 milliGRAM(s) Oral every 8 hours  lidocaine   Patch 1 Patch Transdermal daily  lisinopril 10 milliGRAM(s) Oral daily  mirtazapine 15 milliGRAM(s) Oral at bedtime  sertraline 25 milliGRAM(s) Oral daily    MEDICATIONS  (PRN):  acetaminophen   Tablet 650 milliGRAM(s) Oral every 6 hours PRN Pain or fever  ibuprofen  Tablet 400 milliGRAM(s) Oral every 4 hours PRN Mild pain (1-3)  morphine  - Injectable 2 milliGRAM(s) IV Push every 4 hours PRN Moderate Pain (4 - 6)  morphine  - Injectable 4 milliGRAM(s) IV Push every 4 hours PRN Severe Pain (7 - 10)  ondansetron Injectable 4 milliGRAM(s) IV Push every 6 hours PRN Nausea and/or Vomiting      FAMILY HISTORY:  No pertinent family history      ROS: Negative other than as mentioned in HPI.    Vital Signs Last 24 Hrs  T(C): 37.2 (13 Dec 2017 04:51), Max: 37.2 (13 Dec 2017 04:51)  T(F): 98.9 (13 Dec 2017 04:51), Max: 98.9 (13 Dec 2017 04:51)  HR: 72 (13 Dec 2017 04:51) (72 - 78)  BP: 140/60 la manually (13 Dec 2017 04:51) (173/74 - 183/77)  BP(mean): --  RR: 14 flat and nonlabored. (13 Dec 2017 04:51) (18 - 18)  SpO2: 93% (13 Dec 2017 04:51) (93% - 98%)    PHYSICAL EXAM:  General: Appears well developed, well nourished alert and cooperative. afebrile elderly F nad.  HEENT: Head; normocephalic, atraumatic.  Eyes;   Pupils reactive, cornea wnl.  Neck; Supple, no nodes adenopathy, no NVD + bilat referred carotid bruits No thyromegaly.  CARDIOVASCULAR; unchanged systolic murmur of severe AS. No rub, gallop or lift. Normal S1. S2 absent  LUNGS; No rales, rhonchi or wheeze. Normal breath sounds bilaterally.  ABDOMEN ; Soft, nontender without mass or organomegaly. bowel sounds normoactive.  EXTREMITIES; No clubbing, cyanosis or edema. Distal pulses wnl. ROM normal.  SKIN; warm and dry with normal turgor.  NEURO; Alert/oriented x 3/normal motor exam. No pathologic reflexes.    PSYCH; normal affect.            INTERPRETATION OF TELEMETRY:    ECG: SR lad vpc qs v1-2    I&O's Detail      LABS:                        11.9   9.0   )-----------( 291      ( 11 Dec 2017 22:51 )             36.4     12-    140  |  101  |  15.0  ----------------------------<  97  4.2   |  29.0  |  0.81    Ca    9.0      11 Dec 2017 22:51  Mg     1.9     12-11    TPro  6.4<L>  /  Alb  3.6  /  TBili  0.4  /  DBili  x   /  AST  12  /  ALT  8   /  AlkPhos  88  12-    CARDIAC MARKERS ( 11 Dec 2017 22:51 )  x     / <0.01 ng/mL / 24 U/L / x     / x          PT/INR - ( 11 Dec 2017 22:51 )   PT: 12.3 sec;   INR: 1.12 ratio         PTT - ( 11 Dec 2017 22:51 )  PTT:32.1 sec  Urinalysis Basic - ( 12 Dec 2017 03:47 )    Color: Yellow / Appearance: Slightly Turbid / S.005 / pH: x  Gluc: x / Ketone: Negative  / Bili: Negative / Urobili: Negative mg/dL   Blood: x / Protein: 30 mg/dL / Nitrite: Positive   Leuk Esterase: Moderate / RBC: 3-5 /HPF / WBC 6-10   Sq Epi: x / Non Sq Epi: Few / Bacteria: Many      I&O's Summary      RADIOLOGY & ADDITIONAL STUDIES:

## 2017-12-13 NOTE — PROGRESS NOTE ADULT - ASSESSMENT
The patient is a 89y Female with intracranial mass lesion.  Family opting for hospice    no neuro work up at this time    There is no further neurologic workup suggested at this time.  We will be available for reconsultation as needed.    Thank you.   Lisandro Fountain MD, PhD  [phone]

## 2017-12-13 NOTE — PROGRESS NOTE ADULT - ASSESSMENT
1. DNR/I-palliative care  2. Critical aortic stenosis-Given above-no "aggressive care"  [facility]. will sign off at this time.

## 2017-12-13 NOTE — PROGRESS NOTE ADULT - SUBJECTIVE AND OBJECTIVE BOX
GIULIANO GARZA    [id number]    89y      Female    Patient is a 89y old  Female who presents with a chief complaint of headache, not feeling well. (12 Dec 2017 02:52)      INTERVAL HPI/OVERNIGHT EVENTS:    patient is havinh headache, she is poor historian, unable to get much info    REVIEW OF SYSTEMS:    Unable to get much info      Vital Signs Last 24 Hrs  T(C): 36.7 (13 Dec 2017 15:51), Max: 37.2 (13 Dec 2017 04:51)  T(F): 98 (13 Dec 2017 15:51), Max: 98.9 (13 Dec 2017 04:51)  HR: 74 (13 Dec 2017 15:51) (72 - 78)  BP: 144/72 (13 Dec 2017 15:51) (144/72 - 173/74)  RR: 18 (13 Dec 2017 15:51) (18 - 19)  SpO2: 94% (13 Dec 2017 15:51) (93% - 95%)    PHYSICAL EXAM:    GENERAL: Elderly female looking comfortable  HEENT: PERRL, +EOMI  NECK: soft, Supple, No JVD,   CHEST/LUNG: Decrease air bilaterally; No wheezing  HEART: S1S2+, Regular rate and rhythm; systolic murmurs  ABDOMEN: Soft, Nontender, Nondistended; Bowel sounds present  EXTREMITIES:  1+ Peripheral Pulses, No edema  SKIN: No rashes or lesions  NEURO: AAOX2, patient is not participating in exam    LABS:                        11.9   9.0   )-----------( 291      ( 11 Dec 2017 22:51 )             36.4     12-    140  |  101  |  15.0  ----------------------------<  97  4.2   |  29.0  |  0.81    Ca    9.0      11 Dec 2017 22:51  Mg     1.9     12-11    TPro  6.4<L>  /  Alb  3.6  /  TBili  0.4  /  DBili  x   /  AST  12  /  ALT  8   /  AlkPhos  88  12-11    PT/INR - ( 11 Dec 2017 22:51 )   PT: 12.3 sec;   INR: 1.12 ratio         PTT - ( 11 Dec 2017 22:51 )  PTT:32.1 sec  Urinalysis Basic - ( 12 Dec 2017 03:47 )    Color: Yellow / Appearance: Slightly Turbid / S.005 / pH: x  Gluc: x / Ketone: Negative  / Bili: Negative / Urobili: Negative mg/dL   Blood: x / Protein: 30 mg/dL / Nitrite: Positive   Leuk Esterase: Moderate / RBC: 3-5 /HPF / WBC 6-10   Sq Epi: x / Non Sq Epi: Few / Bacteria: Many          I&O's Summary      MEDICATIONS  (STANDING):  amLODIPine   Tablet 5 milliGRAM(s) Oral daily  atorvastatin 20 milliGRAM(s) Oral at bedtime  cefTRIAXone   IVPB 1 Gram(s) IV Intermittent every 24 hours  cefTRIAXone   IVPB      enoxaparin Injectable 30 milliGRAM(s) SubCutaneous daily  gabapentin 100 milliGRAM(s) Oral every 8 hours  lidocaine   Patch 1 Patch Transdermal daily  lisinopril 10 milliGRAM(s) Oral daily  mirtazapine 15 milliGRAM(s) Oral at bedtime  sertraline 25 milliGRAM(s) Oral daily    MEDICATIONS  (PRN):  acetaminophen   Tablet 650 milliGRAM(s) Oral every 6 hours PRN Pain or fever  ibuprofen  Tablet 400 milliGRAM(s) Oral every 4 hours PRN Mild pain (1-3)  morphine  - Injectable 2 milliGRAM(s) IV Push every 4 hours PRN Moderate Pain (4 - 6)  morphine  - Injectable 4 milliGRAM(s) IV Push every 4 hours PRN Severe Pain (7 - 10)  ondansetron Injectable 4 milliGRAM(s) IV Push every 6 hours PRN Nausea and/or Vomiting

## 2017-12-14 DIAGNOSIS — R53.81 OTHER MALAISE: ICD-10-CM

## 2017-12-14 DIAGNOSIS — G93.9 DISORDER OF BRAIN, UNSPECIFIED: ICD-10-CM

## 2017-12-14 DIAGNOSIS — Z51.5 ENCOUNTER FOR PALLIATIVE CARE: ICD-10-CM

## 2017-12-14 DIAGNOSIS — R51 HEADACHE: ICD-10-CM

## 2017-12-14 DIAGNOSIS — R64 CACHEXIA: ICD-10-CM

## 2017-12-14 LAB
-  AMIKACIN: SIGNIFICANT CHANGE UP
-  AMPICILLIN/SULBACTAM: SIGNIFICANT CHANGE UP
-  AMPICILLIN: SIGNIFICANT CHANGE UP
-  AZTREONAM: SIGNIFICANT CHANGE UP
-  CEFAZOLIN: SIGNIFICANT CHANGE UP
-  CEFEPIME: SIGNIFICANT CHANGE UP
-  CEFOXITIN: SIGNIFICANT CHANGE UP
-  CEFTAZIDIME: SIGNIFICANT CHANGE UP
-  CEFTRIAXONE: SIGNIFICANT CHANGE UP
-  CIPROFLOXACIN: SIGNIFICANT CHANGE UP
-  ERTAPENEM: SIGNIFICANT CHANGE UP
-  GENTAMICIN: SIGNIFICANT CHANGE UP
-  LEVOFLOXACIN: SIGNIFICANT CHANGE UP
-  MEROPENEM: SIGNIFICANT CHANGE UP
-  NITROFURANTOIN: SIGNIFICANT CHANGE UP
-  PIPERACILLIN/TAZOBACTAM: SIGNIFICANT CHANGE UP
-  TOBRAMYCIN: SIGNIFICANT CHANGE UP
-  TRIMETHOPRIM/SULFAMETHOXAZOLE: SIGNIFICANT CHANGE UP
CULTURE RESULTS: SIGNIFICANT CHANGE UP
METHOD TYPE: SIGNIFICANT CHANGE UP
ORGANISM # SPEC MICROSCOPIC CNT: SIGNIFICANT CHANGE UP
ORGANISM # SPEC MICROSCOPIC CNT: SIGNIFICANT CHANGE UP
SPECIMEN SOURCE: SIGNIFICANT CHANGE UP

## 2017-12-14 PROCEDURE — 99222 1ST HOSP IP/OBS MODERATE 55: CPT

## 2017-12-14 PROCEDURE — 99232 SBSQ HOSP IP/OBS MODERATE 35: CPT

## 2017-12-14 RX ORDER — MORPHINE SULFATE 50 MG/1
2 CAPSULE, EXTENDED RELEASE ORAL
Qty: 0 | Refills: 0 | Status: DISCONTINUED | OUTPATIENT
Start: 2017-12-14 | End: 2017-12-16

## 2017-12-14 RX ORDER — HYDRALAZINE HCL 50 MG
10 TABLET ORAL ONCE
Qty: 0 | Refills: 0 | Status: COMPLETED | OUTPATIENT
Start: 2017-12-14 | End: 2017-12-14

## 2017-12-14 RX ORDER — MORPHINE SULFATE 50 MG/1
4 CAPSULE, EXTENDED RELEASE ORAL
Qty: 0 | Refills: 0 | Status: DISCONTINUED | OUTPATIENT
Start: 2017-12-14 | End: 2017-12-16

## 2017-12-14 RX ORDER — MORPHINE SULFATE 50 MG/1
2 CAPSULE, EXTENDED RELEASE ORAL EVERY 6 HOURS
Qty: 0 | Refills: 0 | Status: DISCONTINUED | OUTPATIENT
Start: 2017-12-14 | End: 2017-12-16

## 2017-12-14 RX ADMIN — LIDOCAINE 1 PATCH: 4 CREAM TOPICAL at 03:07

## 2017-12-14 RX ADMIN — AMLODIPINE BESYLATE 5 MILLIGRAM(S): 2.5 TABLET ORAL at 05:31

## 2017-12-14 RX ADMIN — ENOXAPARIN SODIUM 30 MILLIGRAM(S): 100 INJECTION SUBCUTANEOUS at 12:21

## 2017-12-14 RX ADMIN — MORPHINE SULFATE 2 MILLIGRAM(S): 50 CAPSULE, EXTENDED RELEASE ORAL at 17:49

## 2017-12-14 RX ADMIN — MORPHINE SULFATE 2 MILLIGRAM(S): 50 CAPSULE, EXTENDED RELEASE ORAL at 16:15

## 2017-12-14 RX ADMIN — Medication 10 MILLIGRAM(S): at 01:09

## 2017-12-14 RX ADMIN — GABAPENTIN 100 MILLIGRAM(S): 400 CAPSULE ORAL at 05:32

## 2017-12-14 RX ADMIN — LISINOPRIL 10 MILLIGRAM(S): 2.5 TABLET ORAL at 05:31

## 2017-12-14 RX ADMIN — MORPHINE SULFATE 2 MILLIGRAM(S): 50 CAPSULE, EXTENDED RELEASE ORAL at 22:36

## 2017-12-14 RX ADMIN — MORPHINE SULFATE 2 MILLIGRAM(S): 50 CAPSULE, EXTENDED RELEASE ORAL at 16:35

## 2017-12-14 RX ADMIN — MORPHINE SULFATE 2 MILLIGRAM(S): 50 CAPSULE, EXTENDED RELEASE ORAL at 17:31

## 2017-12-14 RX ADMIN — MORPHINE SULFATE 2 MILLIGRAM(S): 50 CAPSULE, EXTENDED RELEASE ORAL at 23:06

## 2017-12-14 RX ADMIN — CEFTRIAXONE 100 GRAM(S): 500 INJECTION, POWDER, FOR SOLUTION INTRAMUSCULAR; INTRAVENOUS at 05:31

## 2017-12-14 NOTE — CONSULT NOTE ADULT - SUBJECTIVE AND OBJECTIVE BOX
HPI: 89F with PMH as listed admitted 12/12 with     PERTINENT PMH REVIEWED: Yes     PAST MEDICAL & SURGICAL HISTORY:  Breast cancer: rt. breast ca.  Meningioma  Fall  Stroke  Retinal detachment  Hypertension  History of right mastectomy: rt. mastectomy.    SOCIAL HISTORY:                                     Admitted from:  home      Surrogate -     FAMILY HISTORY:  No pertinent family history    Baseline ADLs (prior to admission):  Independent/ Dependent      Allergies    No Known Allergies    Present Symptoms:     Dyspnea: 0 1 2 3   Nausea/Vomiting: Yes No  Anxiety:  Yes No  Depression: Yes No  Fatigue: Yes No  Loss of appetite: Yes No    Pain:             Character-            Duration-            Effect-            Factors-            Frequency-            Location-            Severity-    Review of Systems: Reviewed                     Negative:                     Positive:  Unable to obtain due to poor mentation   All others negative    MEDICATIONS  (STANDING):  lidocaine   Patch 1 Patch Transdermal daily  morphine  - Injectable 2 milliGRAM(s) IV Push every 6 hours    MEDICATIONS  (PRN):  morphine  - Injectable 2 milliGRAM(s) IV Push every 2 hours PRN mild- mod pain  morphine  - Injectable 4 milliGRAM(s) IV Push every 2 hours PRN Severe Pain (7 - 10)  morphine  - Injectable 4 milliGRAM(s) IV Push every 2 hours PRN severe dyspnea  morphine  - Injectable 2 milliGRAM(s) IV Push every 2 hours PRN mild to moderate dyspnea  ondansetron Injectable 4 milliGRAM(s) IV Push every 6 hours PRN Nausea and/or Vomiting    PHYSICAL EXAM:    Vital Signs Last 24 Hrs  T(C): 37.1 (14 Dec 2017 08:43), Max: 37.1 (14 Dec 2017 08:43)  T(F): 98.8 (14 Dec 2017 08:43), Max: 98.8 (14 Dec 2017 08:43)  HR: 84 (14 Dec 2017 08:43) (65 - 93)  BP: 182/80 (14 Dec 2017 08:43) (163/78 - 192/85)  BP(mean): --  RR: 26 (14 Dec 2017 12:26) (18 - 26)  SpO2: 96% (14 Dec 2017 00:35) (96% - 96%)    General: alert  oriented x ____ lethargic agitated                  cachexia  nonverbal  coma    Karnofsky:  %    HEENT: normal  dry mouth  ET tube/trach    Lungs: comfortable tachypnea/labored breathing  excessive secretions    CV: normal  tachycardia    GI: normal  distended  tender  no BS               PEG/NG/OG tube  constipation  last BM:     : normal  incontinent  oliguria/anuria  hummel    MSK: normal  weakness  edema             ambulatory  bedbound/wheelchair bound    Skin: normal  pressure ulcers- Stage_____  no rash    LABS:              I&O's Summary      RADIOLOGY & ADDITIONAL STUDIES:    ADVANCE DIRECTIVES:   DNR YES NO  Completed on:                     MOLST  YES NO   Completed on:  Living Will  YES NO   Completed on: HPI: 89F with PMH as listed admitted 12/12 with altered mental state, large right posterior fossa mass, family opting for comfort care only.     PERTINENT PMH REVIEWED: Yes     PAST MEDICAL & SURGICAL HISTORY:  Breast cancer: rt. breast ca.  Meningioma  Fall  Stroke  Retinal detachment  Hypertension  History of right mastectomy: rt. mastectomy.    SOCIAL HISTORY:  no EtOH                                    Admitted from:  home      Surrogate - braxton Loredo     FAMILY HISTORY:  No pertinent family history    Baseline ADLs (prior to admission):   Dependent      Allergies    No Known Allergies    Present Symptoms:     Dyspnea: 1  Nausea/Vomiting: No  Anxiety:  No  Depression: unable   Fatigue: yes   Loss of appetite: Yes     Pain: + headache, brow furrowing, she is unable to characterize             Character-            Duration-            Effect-            Factors-            Frequency-            Location-            Severity-    Review of Systems: Reviewed                 Unable to obtain due to poor mentation   All others negative    MEDICATIONS  (STANDING):  lidocaine   Patch 1 Patch Transdermal daily  morphine  - Injectable 2 milliGRAM(s) IV Push every 6 hours    MEDICATIONS  (PRN):  morphine  - Injectable 2 milliGRAM(s) IV Push every 2 hours PRN mild- mod pain  morphine  - Injectable 4 milliGRAM(s) IV Push every 2 hours PRN Severe Pain (7 - 10)  morphine  - Injectable 4 milliGRAM(s) IV Push every 2 hours PRN severe dyspnea  morphine  - Injectable 2 milliGRAM(s) IV Push every 2 hours PRN mild to moderate dyspnea  ondansetron Injectable 4 milliGRAM(s) IV Push every 6 hours PRN Nausea and/or Vomiting    PHYSICAL EXAM:    Vital Signs Last 24 Hrs  T(C): 37.1 (14 Dec 2017 08:43), Max: 37.1 (14 Dec 2017 08:43)  T(F): 98.8 (14 Dec 2017 08:43), Max: 98.8 (14 Dec 2017 08:43)  HR: 84 (14 Dec 2017 08:43) (65 - 93)  BP: 182/80 (14 Dec 2017 08:43) (163/78 - 192/85)  BP(mean): --  RR: 26 (14 Dec 2017 12:26) (18 - 26)  SpO2: 96% (14 Dec 2017 00:35) (96% - 96%)    General: lethargic; cachexia     Karnofsky:  20 %    HEENT: dry mouth      Lungs: comfortable     CV: normal      GI: normal      : normal      MSK: weakness      Skin: no rash    LABS: none     I&O's Summary    RADIOLOGY & ADDITIONAL STUDIES:    ADVANCE DIRECTIVES: DNR/I - MOLST

## 2017-12-14 NOTE — CONSULT NOTE ADULT - ATTENDING COMMENTS
COUNSELING:  Face to face meeting to discuss Advanced Care Planning - Time Spent ______Minutes.  See goals of care note.      Thank you for the opportunity to assist with the care of this patient.   [facility] [phone].

## 2017-12-14 NOTE — PROGRESS NOTE ADULT - ASSESSMENT
Patient is admitted for brain mass, MRI BRAIN WITH AND WITHOUT CONTRAST is ordered. day time to follow and further plan as per findings.     TIA unspecified: will continue with aspirin, PO MEDS has been resumed as she passed swallow eval, neuro consult appreciated, patient has hx of meningioma, patient and family know about that, she is DNR and DNI, patient and family don't want any intervention, they wanted palliative and hospice care, MOLTs form filled along with DNR/DNI order put in, patient has been started on some pain meds, patient has been seen by Palliative and hospice team and was rejected from being inpatient hospice, will discuss with family if the wanted to do home with hospice      UTI, site unspecified, no hematuria. will keep on rocephin, she is poor historian, I dont know if she has symptoms, follow urine cx.    Heart murmur: no further work up as per family.     HTN: will continue with home meds, will monitor BP and adjust meds    SCDs Patient is admitted for brain mass, MRI BRAIN WITH AND WITHOUT CONTRAST is ordered. day time to follow and further plan as per findings.     TIA unspecified: will continue with aspirin, PO MEDS has been resumed as she passed swallow eval, neuro consult appreciated, patient has hx of meningioma, patient and family know about that, she is DNR and DNI, patient and family don't want any intervention, they wanted palliative and hospice care, MOLTs form filled along with DNR/DNI order put in, patient has been started on some pain meds, hospice team is board, will discuss with family if the wanted to do home with hospice    UTI, site unspecified, no hematuria. will keep on rocephin, she is poor historian, I don't know if she has symptoms, follow urine cx.    Heart murmur: no further work up as per family.     HTN: will continue with home meds, will monitor BP and adjust meds    SCDs

## 2017-12-14 NOTE — PROGRESS NOTE ADULT - SUBJECTIVE AND OBJECTIVE BOX
GIULIANO GARZA    [id number]    89y      Female    Patient is a 89y old  Female who presents with a chief complaint of headache, not feeling well. (12 Dec 2017 02:52)      INTERVAL HPI/OVERNIGHT EVENTS:    patient is havinh headache, she is poor historian, unable to get much info    REVIEW OF SYSTEMS:    Unable to get much info       Vital Signs Last 24 Hrs  T(C): 37.1 (14 Dec 2017 08:43), Max: 37.1 (14 Dec 2017 08:43)  T(F): 98.8 (14 Dec 2017 08:43), Max: 98.8 (14 Dec 2017 08:43)  HR: 84 (14 Dec 2017 08:43) (65 - 93)  BP: 182/80 (14 Dec 2017 08:43) (163/78 - 192/85)  RR: 26 (14 Dec 2017 12:26) (18 - 26)  SpO2: 96% (14 Dec 2017 00:35) (96% - 96%)    PHYSICAL EXAM:    GENERAL: Elderly female looking comfortable  HEENT: PERRL, +EOMI  NECK: soft, Supple, No JVD,   CHEST/LUNG: Decrease air bilaterally; No wheezing  HEART: S1S2+, Regular rate and rhythm; systolic murmurs  ABDOMEN: Soft, Nontender, Nondistended; Bowel sounds present  EXTREMITIES:  1+ Peripheral Pulses, No edema  SKIN: No rashes or lesions  NEURO: AAOX2, patient is not participating in exam      MEDICATIONS  (STANDING):  lidocaine   Patch 1 Patch Transdermal daily  morphine  - Injectable 2 milliGRAM(s) IV Push every 6 hours    MEDICATIONS  (PRN):  morphine  - Injectable 2 milliGRAM(s) IV Push every 2 hours PRN mild- mod pain  morphine  - Injectable 4 milliGRAM(s) IV Push every 2 hours PRN Severe Pain (7 - 10)  morphine  - Injectable 4 milliGRAM(s) IV Push every 2 hours PRN severe dyspnea  morphine  - Injectable 2 milliGRAM(s) IV Push every 2 hours PRN mild to moderate dyspnea  ondansetron Injectable 4 milliGRAM(s) IV Push every 6 hours PRN Nausea and/or Vomiting GIULIANO GARZA    [id number]    89y      Female    Patient is a 89y old  Female who presents with a chief complaint of headache, not feeling well. (12 Dec 2017 02:52)      INTERVAL HPI/OVERNIGHT EVENTS:    patient is not eating much and having some SOB, headache is better, she is poor historian, unable to get much info    REVIEW OF SYSTEMS:    Headache is little better, has some SOB, not eating much.       Vital Signs Last 24 Hrs  T(C): 37.1 (14 Dec 2017 08:43), Max: 37.1 (14 Dec 2017 08:43)  T(F): 98.8 (14 Dec 2017 08:43), Max: 98.8 (14 Dec 2017 08:43)  HR: 84 (14 Dec 2017 08:43) (65 - 93)  BP: 182/80 (14 Dec 2017 08:43) (163/78 - 192/85)  RR: 26 (14 Dec 2017 12:26) (18 - 26)  SpO2: 96% (14 Dec 2017 00:35) (96% - 96%)    PHYSICAL EXAM:    GENERAL: Elderly female looking comfortable  HEENT: PERRL, +EOMI  NECK: soft, Supple, No JVD,   CHEST/LUNG: Decrease air bilaterally; No wheezing  HEART: S1S2+, Regular rate and rhythm; systolic murmurs  ABDOMEN: Soft, Nontender, Nondistended; Bowel sounds present  EXTREMITIES:  1+ Peripheral Pulses, No edema  NEURO: AAOX1, patient is not participating in exam      MEDICATIONS  (STANDING):  lidocaine   Patch 1 Patch Transdermal daily  morphine  - Injectable 2 milliGRAM(s) IV Push every 6 hours    MEDICATIONS  (PRN):  morphine  - Injectable 2 milliGRAM(s) IV Push every 2 hours PRN mild- mod pain  morphine  - Injectable 4 milliGRAM(s) IV Push every 2 hours PRN Severe Pain (7 - 10)  morphine  - Injectable 4 milliGRAM(s) IV Push every 2 hours PRN severe dyspnea  morphine  - Injectable 2 milliGRAM(s) IV Push every 2 hours PRN mild to moderate dyspnea  ondansetron Injectable 4 milliGRAM(s) IV Push every 6 hours PRN Nausea and/or Vomiting

## 2017-12-14 NOTE — DISCHARGE NOTE ADULT - PATIENT PORTAL LINK FT
“You can access the [facility] Patient Portal, offered by [facility], by registering with the following website: [email or web address]”

## 2017-12-14 NOTE — CONSULT NOTE ADULT - ASSESSMENT
The patient is a 89y Female with posterior fossa mass now with increased hypodensity on CT making meningioma less likely.   No objection to MRI brain.    Patient and family have elected not to pursue aggressive intervention.     Recommend Palliative Care evaluation.
1. Elderly F with headache and hx of meningioma. CT shows enlarging posterior fossa mass.  2. Critical aortic stenosis by exam and echo of 9/17 with preserved LVEF. -ordinarily pt could be considered for a TAVR but evaluation of brain mass and pt's wishes need evaluation first. Consider MRI  3. remote CVA  4. remote right breast ca  5. hypertension-resume norvasc and ace-I
89F with posterior fossa mass, severe headache, not eating, cachexia, debility, in dying phases, now also with some dyspnea.

## 2017-12-14 NOTE — CONSULT NOTE ADULT - PROBLEM SELECTOR RECOMMENDATION 5
- family opting for comfort care only. hospice evaluation for inpatient now that her condition has deteriorated.

## 2017-12-14 NOTE — CONSULT NOTE ADULT - PROBLEM SELECTOR RECOMMENDATION 2
-patient had brow furrowing and when I woke her up she mouthed headache to me. She is clearly in pain, will order morphine ATC and PRN

## 2017-12-15 VITALS
OXYGEN SATURATION: 87 % | HEART RATE: 124 BPM | TEMPERATURE: 97 F | RESPIRATION RATE: 20 BRPM | SYSTOLIC BLOOD PRESSURE: 169 MMHG | DIASTOLIC BLOOD PRESSURE: 96 MMHG

## 2017-12-15 PROCEDURE — 99232 SBSQ HOSP IP/OBS MODERATE 35: CPT

## 2017-12-15 RX ADMIN — MORPHINE SULFATE 2 MILLIGRAM(S): 50 CAPSULE, EXTENDED RELEASE ORAL at 23:15

## 2017-12-15 RX ADMIN — MORPHINE SULFATE 2 MILLIGRAM(S): 50 CAPSULE, EXTENDED RELEASE ORAL at 06:28

## 2017-12-15 RX ADMIN — MORPHINE SULFATE 4 MILLIGRAM(S): 50 CAPSULE, EXTENDED RELEASE ORAL at 14:10

## 2017-12-15 RX ADMIN — MORPHINE SULFATE 4 MILLIGRAM(S): 50 CAPSULE, EXTENDED RELEASE ORAL at 15:59

## 2017-12-15 RX ADMIN — MORPHINE SULFATE 2 MILLIGRAM(S): 50 CAPSULE, EXTENDED RELEASE ORAL at 11:40

## 2017-12-15 RX ADMIN — MORPHINE SULFATE 2 MILLIGRAM(S): 50 CAPSULE, EXTENDED RELEASE ORAL at 18:07

## 2017-12-15 RX ADMIN — MORPHINE SULFATE 2 MILLIGRAM(S): 50 CAPSULE, EXTENDED RELEASE ORAL at 23:44

## 2017-12-15 RX ADMIN — MORPHINE SULFATE 2 MILLIGRAM(S): 50 CAPSULE, EXTENDED RELEASE ORAL at 09:25

## 2017-12-15 RX ADMIN — MORPHINE SULFATE 4 MILLIGRAM(S): 50 CAPSULE, EXTENDED RELEASE ORAL at 22:10

## 2017-12-15 RX ADMIN — MORPHINE SULFATE 4 MILLIGRAM(S): 50 CAPSULE, EXTENDED RELEASE ORAL at 16:15

## 2017-12-15 RX ADMIN — MORPHINE SULFATE 4 MILLIGRAM(S): 50 CAPSULE, EXTENDED RELEASE ORAL at 13:52

## 2017-12-15 RX ADMIN — MORPHINE SULFATE 2 MILLIGRAM(S): 50 CAPSULE, EXTENDED RELEASE ORAL at 19:58

## 2017-12-15 RX ADMIN — MORPHINE SULFATE 2 MILLIGRAM(S): 50 CAPSULE, EXTENDED RELEASE ORAL at 18:20

## 2017-12-15 RX ADMIN — MORPHINE SULFATE 2 MILLIGRAM(S): 50 CAPSULE, EXTENDED RELEASE ORAL at 22:45

## 2017-12-15 RX ADMIN — MORPHINE SULFATE 2 MILLIGRAM(S): 50 CAPSULE, EXTENDED RELEASE ORAL at 20:28

## 2017-12-15 RX ADMIN — MORPHINE SULFATE 2 MILLIGRAM(S): 50 CAPSULE, EXTENDED RELEASE ORAL at 05:58

## 2017-12-15 RX ADMIN — MORPHINE SULFATE 4 MILLIGRAM(S): 50 CAPSULE, EXTENDED RELEASE ORAL at 22:40

## 2017-12-15 RX ADMIN — MORPHINE SULFATE 2 MILLIGRAM(S): 50 CAPSULE, EXTENDED RELEASE ORAL at 00:45

## 2017-12-15 RX ADMIN — Medication 0.5 MILLIGRAM(S): at 21:46

## 2017-12-15 RX ADMIN — MORPHINE SULFATE 2 MILLIGRAM(S): 50 CAPSULE, EXTENDED RELEASE ORAL at 00:15

## 2017-12-15 RX ADMIN — MORPHINE SULFATE 2 MILLIGRAM(S): 50 CAPSULE, EXTENDED RELEASE ORAL at 09:09

## 2017-12-15 RX ADMIN — MORPHINE SULFATE 2 MILLIGRAM(S): 50 CAPSULE, EXTENDED RELEASE ORAL at 11:21

## 2017-12-15 NOTE — GOALS OF CARE CONVERSATION - PERSONAL ADVANCE DIRECTIVE - NS PRO AD PATIENT TYPE
Medical Orders for Life-Sustaining Treatment (MOLST)/Do Not Resuscitate (DNR)
Health Care Proxy (HCP)

## 2017-12-15 NOTE — GOALS OF CARE CONVERSATION - PERSONAL ADVANCE DIRECTIVE - CONVERSATION DETAILS
Pt awaiting a bed at the [facility]. Please call [phone] on 12/16 for bed status.

## 2017-12-15 NOTE — PROGRESS NOTE ADULT - ASSESSMENT
Patient is admitted for brain mass, MRI BRAIN WITH AND WITHOUT CONTRAST is ordered. day time to follow and further plan as per findings.     TIA unspecified: will continue with aspirin, PO MEDS has been resumed as she passed swallow eval, neuro consult appreciated, patient has hx of meningioma, patient and family know about that, she is DNR and DNI, patient and family don't want any intervention, they wanted palliative and hospice care, MOLTs form filled along with DNR/DNI order put in, patient has been started on some pain meds, hospice team is board, will discuss with family if the wanted to do home with hospice    UTI, site unspecified, no hematuria. will keep on rocephin, she is poor historian, I don't know if she has symptoms, follow urine cx.    Heart murmur: no further work up as per family.     HTN: will continue with home meds, will monitor BP and adjust meds    SCDs Patient is admitted for brain mass, MRI BRAIN WITH AND WITHOUT CONTRAST is ordered. day time to follow and further plan as per findings.     TIA unspecified: will continue with aspirin, PO MEDS has been resumed as she passed swallow eval, neuro consult appreciated, patient has hx of meningioma, patient and family know about that, she is DNR and DNI, patient and family don't want any intervention, they wanted palliative and hospice care, MOLTs form filled along with DNR/DNI order put in, patient has been started on comfort meds, hospice team is board, patient will be going to hospice in once the bed is available.     UTI, site unspecified, no hematuria, no further treatment, she got antibiotic for 3 days     Heart murmur: no further work up as per family, comfort and care     HTN: BP meds held, comfort measure only, no vital monitoring.

## 2017-12-15 NOTE — PROGRESS NOTE ADULT - SUBJECTIVE AND OBJECTIVE BOX
GIULIANO GARZA    [id number]    89y      Female    Patient is a 89y old  Female who presents with a chief complaint of headache, not feeling well. (14 Dec 2017 22:06)      INTERVAL HPI/OVERNIGHT EVENTS:    patient is not eating much and having some SOB, headache is better, she is poor historian, unable to get much info    REVIEW OF SYSTEMS:    Headache is little better, has some SOB, not eating much.       Vital Signs Last 24 Hrs  T(C): 36.8 (15 Dec 2017 15:53), Max: 36.8 (15 Dec 2017 07:52)  T(F): 98.2 (15 Dec 2017 15:53), Max: 98.2 (15 Dec 2017 07:52)  HR: 87 (15 Dec 2017 15:53) (80 - 87)  BP: 155/85 (15 Dec 2017 15:53) (110/73 - 183/84)  BP(mean): --  RR: 18 (15 Dec 2017 15:53) (18 - 18)  SpO2: 93% (15 Dec 2017 15:53) (93% - 96%)    PHYSICAL EXAM:    GENERAL: Elderly female looking comfortable  HEENT: PERRL, +EOMI  NECK: soft, Supple, No JVD,   CHEST/LUNG: Decrease air bilaterally; No wheezing  HEART: S1S2+, Regular rate and rhythm; systolic murmurs  ABDOMEN: Soft, Nontender, Nondistended; Bowel sounds present  EXTREMITIES:  1+ Peripheral Pulses, No edema  NEURO: AAOX1, patient is not participating in exam        MEDICATIONS  (STANDING):  lidocaine   Patch 1 Patch Transdermal daily  morphine  - Injectable 2 milliGRAM(s) IV Push every 6 hours    MEDICATIONS  (PRN):  LORazepam   Injectable 0.5 milliGRAM(s) IV Push every 4 hours PRN Agitation  morphine  - Injectable 2 milliGRAM(s) IV Push every 2 hours PRN mild- mod pain  morphine  - Injectable 4 milliGRAM(s) IV Push every 2 hours PRN Severe Pain (7 - 10)  morphine  - Injectable 4 milliGRAM(s) IV Push every 2 hours PRN severe dyspnea  morphine  - Injectable 2 milliGRAM(s) IV Push every 2 hours PRN mild to moderate dyspnea  ondansetron Injectable 4 milliGRAM(s) IV Push every 6 hours PRN Nausea and/or Vomiting GIULIANO GARZA    [id number]    89y      Female    Patient is a 89y old  Female who presents with a chief complaint of headache, not feeling well. (14 Dec 2017 22:06)      INTERVAL HPI/OVERNIGHT EVENTS:    patient is looking comfortable, SOB and headache is better, she is poor historian, unable to get much info    REVIEW OF SYSTEMS:    headache and SOB is better with comfort meds, not eating much.       Vital Signs Last 24 Hrs  T(C): 36.8 (15 Dec 2017 15:53), Max: 36.8 (15 Dec 2017 07:52)  T(F): 98.2 (15 Dec 2017 15:53), Max: 98.2 (15 Dec 2017 07:52)  HR: 87 (15 Dec 2017 15:53) (80 - 87)  BP: 155/85 (15 Dec 2017 15:53) (110/73 - 183/84)  RR: 18 (15 Dec 2017 15:53) (18 - 18)  SpO2: 93% (15 Dec 2017 15:53) (93% - 96%)    PHYSICAL EXAM:    GENERAL: Elderly female looking comfortable  HEENT: PERRL, +EOMI  NECK: soft, Supple, No JVD,   CHEST/LUNG: Decrease air bilaterally; No wheezing  HEART: S1S2+, Regular rate and rhythm; systolic murmurs  ABDOMEN: Soft, Nontender, Nondistended; Bowel sounds present  EXTREMITIES:  1+ Peripheral Pulses, No edema  NEURO: AAOX1, patient is not participating in exam        MEDICATIONS  (STANDING):  lidocaine   Patch 1 Patch Transdermal daily  morphine  - Injectable 2 milliGRAM(s) IV Push every 6 hours    MEDICATIONS  (PRN):  LORazepam   Injectable 0.5 milliGRAM(s) IV Push every 4 hours PRN Agitation  morphine  - Injectable 2 milliGRAM(s) IV Push every 2 hours PRN mild- mod pain  morphine  - Injectable 4 milliGRAM(s) IV Push every 2 hours PRN Severe Pain (7 - 10)  morphine  - Injectable 4 milliGRAM(s) IV Push every 2 hours PRN severe dyspnea  morphine  - Injectable 2 milliGRAM(s) IV Push every 2 hours PRN mild to moderate dyspnea  ondansetron Injectable 4 milliGRAM(s) IV Push every 6 hours PRN Nausea and/or Vomiting

## 2017-12-16 PROCEDURE — 70450 CT HEAD/BRAIN W/O DYE: CPT

## 2017-12-16 PROCEDURE — 87086 URINE CULTURE/COLONY COUNT: CPT

## 2017-12-16 PROCEDURE — 84484 ASSAY OF TROPONIN QUANT: CPT

## 2017-12-16 PROCEDURE — 80053 COMPREHEN METABOLIC PANEL: CPT

## 2017-12-16 PROCEDURE — 83036 HEMOGLOBIN GLYCOSYLATED A1C: CPT

## 2017-12-16 PROCEDURE — 92610 EVALUATE SWALLOWING FUNCTION: CPT

## 2017-12-16 PROCEDURE — 87186 SC STD MICRODIL/AGAR DIL: CPT

## 2017-12-16 PROCEDURE — 36415 COLL VENOUS BLD VENIPUNCTURE: CPT

## 2017-12-16 PROCEDURE — 85610 PROTHROMBIN TIME: CPT

## 2017-12-16 PROCEDURE — 80061 LIPID PANEL: CPT

## 2017-12-16 PROCEDURE — 83605 ASSAY OF LACTIC ACID: CPT

## 2017-12-16 PROCEDURE — 85730 THROMBOPLASTIN TIME PARTIAL: CPT

## 2017-12-16 PROCEDURE — 82962 GLUCOSE BLOOD TEST: CPT

## 2017-12-16 PROCEDURE — 93005 ELECTROCARDIOGRAM TRACING: CPT

## 2017-12-16 PROCEDURE — 81001 URINALYSIS AUTO W/SCOPE: CPT

## 2017-12-16 PROCEDURE — 83735 ASSAY OF MAGNESIUM: CPT

## 2017-12-16 PROCEDURE — 82550 ASSAY OF CK (CPK): CPT

## 2017-12-16 PROCEDURE — 82140 ASSAY OF AMMONIA: CPT

## 2017-12-16 PROCEDURE — 99285 EMERGENCY DEPT VISIT HI MDM: CPT | Mod: 25

## 2017-12-16 PROCEDURE — 85027 COMPLETE CBC AUTOMATED: CPT

## 2017-12-16 PROCEDURE — 71045 X-RAY EXAM CHEST 1 VIEW: CPT

## 2017-12-16 PROCEDURE — 97163 PT EVAL HIGH COMPLEX 45 MIN: CPT

## 2017-12-16 RX ADMIN — MORPHINE SULFATE 2 MILLIGRAM(S): 50 CAPSULE, EXTENDED RELEASE ORAL at 00:14

## 2017-12-16 RX ADMIN — MORPHINE SULFATE 4 MILLIGRAM(S): 50 CAPSULE, EXTENDED RELEASE ORAL at 01:23

## 2017-12-16 RX ADMIN — MORPHINE SULFATE 4 MILLIGRAM(S): 50 CAPSULE, EXTENDED RELEASE ORAL at 01:53

## 2017-12-16 NOTE — DISCHARGE NOTE FOR THE EXPIRED PATIENT - SECONDARY DIAGNOSIS.
Brain mass Meningioma Intractable headache, unspecified chronicity pattern, unspecified headache type TIA (transient ischemic attack) Cachexia Hypertension

## 2017-12-16 NOTE — DISCHARGE NOTE FOR THE EXPIRED PATIENT - HOSPITAL COURSE
admitted for brain mass, MRI BRAIN WITH AND WITHOUT CONTRAST is ordered. day time to follow and further plan as per findings. Admitted for brain mass, MRI BRAIN WITH AND WITHOUT CONTRAST is ordered TIA unspecified: will continue with aspirin, PO MEDS were then resumed after she passed swallow eval, neuro consult appreciated, patient has hx of meningioma, patient and family know about that, she is DNR and DNI, patient and family don't want any intervention, they wanted palliative and hospice care, MOLTs form filled along with DNR/DNI order put in, patient has been started on some pain meds, patient has been seen by Palliative and hospice team and was rejected from being inpatient hospice, will discuss with family if the wanted to do home with hospice  UTI, no hematuria. was put on rocephin.  Patient had plan to leave and go to Hospice as soon as possible. Only receiving Palliative care. Admitted for brain mass, MRI BRAIN WITH AND WITHOUT CONTRAST is ordered TIA unspecified: will continue with aspirin, PO MEDS were then resumed after she passed swallow eval, neuro consult appreciated, patient has hx of meningioma, patient and family know about that, she is DNR and DNI, patient and family don't want any intervention, they wanted palliative and hospice care, MOLTs form filled along with DNR/DNI order put in, patient has been started on some pain meds, patient has been seen by Palliative and hospice team and was rejected from being inpatient hospice, will discuss with family if the wanted to do home with hospice, patient was continue with comfort meds and comfort measures, she had plan to [facility] as [facility] not have bed available, she was continue with comfort care as per recommendations of Hospice team, patient became unresponsive and was not breathing, evaluated by the PA and was pronounced dead @ 4:30am on 12/16/17.

## 2018-07-23 PROBLEM — Z86.73 HISTORY OF STROKE: Status: RESOLVED | Noted: 2017-08-11 | Resolved: 2018-07-23

## 2018-07-23 PROBLEM — M79.2 NEUROPATHIC PAIN: Status: ACTIVE | Noted: 2017-08-11

## 2018-10-29 NOTE — H&P ADULT - NS NEC GEN PE MLT EXAM PC
Continue Regimen: Cheryle Care facial scrub to wash face in the morning \\nSpironolactone 100 mg take one pill daily \\nTriple Antioxidant Serum Apply to face in the morning\\nSPF daily that has the zinc oxide and titanium dioxide \\nGlytone mild wash to wash face in the morning and night \\nEpiduo Forte Gel Apply thin layer to face once at night. \\n\\nAdd\\nAczone 7.5% gel Apply to face in the morning
Plan: Recommend to switch current oral contraceptive to [place] or Eva
Detail Level: Zone
detailed exam

## 2019-06-27 NOTE — DISCHARGE NOTE ADULT - NSTOBACCOHOTLINE_GEN_A_NCS
[facility] Smokers Quitline ([id number]) Alert and oriented to person, place and time

## 2022-08-18 NOTE — SWALLOW BEDSIDE ASSESSMENT ADULT - H & P REVIEW
Fax received from [facility] requesting patient's previous mammogram films and or reports. Call to patient and left VM to return call to clinic. yes

## 2024-04-13 NOTE — PHYSICAL THERAPY INITIAL EVALUATION ADULT - LEVEL OF INDEPENDENCE: STAIR NEGOTIATION, REHAB EVAL
unable to perform bilateral upper extremity Active ROM was WFL (within functional limits)/bilateral  lower extremity Active ROM was WFL (within functional limits)